# Patient Record
Sex: MALE | Race: WHITE | NOT HISPANIC OR LATINO | Employment: FULL TIME | URBAN - METROPOLITAN AREA
[De-identification: names, ages, dates, MRNs, and addresses within clinical notes are randomized per-mention and may not be internally consistent; named-entity substitution may affect disease eponyms.]

---

## 2020-01-17 ENCOUNTER — OFFICE VISIT (OUTPATIENT)
Dept: ORTHOPEDICS | Facility: CLINIC | Age: 40
End: 2020-01-17
Payer: COMMERCIAL

## 2020-01-17 VITALS — BODY MASS INDEX: 23.8 KG/M2 | WEIGHT: 170 LBS | HEIGHT: 71 IN

## 2020-01-17 VITALS — WEIGHT: 170 LBS | HEIGHT: 71 IN | BODY MASS INDEX: 23.8 KG/M2

## 2020-01-17 DIAGNOSIS — M25.511 CHRONIC RIGHT SHOULDER PAIN: Primary | ICD-10-CM

## 2020-01-17 DIAGNOSIS — G89.29 CHRONIC RIGHT SHOULDER PAIN: Primary | ICD-10-CM

## 2020-01-17 DIAGNOSIS — G25.89 SCAPULAR DYSKINESIS: ICD-10-CM

## 2020-01-17 DIAGNOSIS — Z47.89 ORTHOPEDIC AFTERCARE: Primary | ICD-10-CM

## 2020-01-17 ASSESSMENT — MIFFLIN-ST. JEOR
SCORE: 1708.24
SCORE: 1708.24

## 2020-01-17 NOTE — PROGRESS NOTES
SPORTS & ORTHOPEDIC WALK-IN VISIT 1/17/2020    Primary Care Physician:      About a year ago he was playing dodgeball and he noticed a soreness after. In the Spring he played softball and had shoulder soreness the whole season. He noticed changing his throw which put more stress on his shoulder. He hasn't played softball in a while and the shoulder soreness/pain is still bothering him. He has pain while sleeping on it as well. He doesn't have pain with weight lifting. He did notice that had pain with jumping jacks in a workout class. The majority of his pain is on the top of his shoulder.     Reason for visit:     What part of your body is injured / painful?  right shoulder    What caused the injury /pain? No inciting event     How long ago did your injury occur or pain begin? several months ago    What are your most bothersome symptoms? Pain    How would you characterize your symptom?  Sharp, shooting and soreness    What makes your symptoms better? Nothing    What makes your symptoms worse? Overhead motion    Have you been previously seen for this problem? No    Medical History:    Any recent changes to your medical history? No    Any new medication prescribed since last visit? No    Have you had surgery on this body part before? No    Social History:    Occupation: Teacher    Handedness: Right    Exercise: Softball, lifting    Review of Systems:    Do you have fever, chills, weight loss? No    Do you have any vision problems? No    Do you have any chest pain or edema? No    Do you have any shortness of breath or wheezing?  No    Do you have stomach problems? No    Do you have any numbness or focal weakness? No    Do you have diabetes? No    Do you have problems with bleeding or clotting? No    Do you have an rashes or other skin lesions? No

## 2020-01-17 NOTE — LETTER
"  2020      RE: Nate Rubio  [address] [address]       [facility]  Orthopedics  Emmanuel Coley MD  2020     Name: Nate Rubio  MRN: [id number]  Age: 39 year old  : 1980  Referring provider: Referred Self     Chief Complaint: Surgical Followup (DOS 14 Right Knee Medial PatellaFemoral Ligament Reconstruction With Allograft, Patellar Chondrocyte Implantation, patellar realignment)     Date of Surgery: 2014    Procedure:   1.  Right knee medial patellofemoral ligament reconstruction with allograft.   2.  Right knee patellar autologous chondrocyte implantation.      History of Present Illness:   Nate Rubio is a 39 year old male 6 years status-post the above procedure who presents for postoperative evaluation. He reports no episodes of patellar instability. The patient skates once a week during the winter and plays softball in the summers. He runs and bikes occassionally.     Scores knee as 84. No episodes of knees slipping out. No swelling.       Physical Examination:  Ht 1.803 m (5' 11\")   Wt 77.1 kg (170 lb)   BMI 23.71 kg/m       General: Alert, oriented, no distress.  Skin: Cool to touch without erythema, ecchymosis, or lesions. No dystrophic changes.  Neuro: Neurovascularly intact distally. Sensation intact to light touch.  Cardiovascular: Capillary refill brisk.    Left Knee: No effusion. Range of motion from 4-5 degrees of hyperextension to 140 degrees of flexion on the right, and 4 to 140 on the left. No apprehension or crepetation.      Assessment:   39 year old male  6 years s/p MPFL recontruction and ACI doing very well. Dscussed appropriate activity level and strength training.      Plan:     No extreme running     The patient should begin a program for maintaining strength     Follow up at 10 years. Return ASAP if knee swells or any abnormalities appear.     I spent 15 minutes with the patient, 10 minutes dedicated to counseling, education, " and development of a treatment plan.    Scribe Disclosure:  I, Christine Garvin, am serving as a scribe to document services personally performed by Emmanuel Coley MD at this visit, based upon the provider's statements to me. All documentation has been reviewed by the aforementioned provider prior to being entered into the official medical record.          Emmanuel Coley MD

## 2020-01-17 NOTE — LETTER
RE: Nate Rubio  [address] [address]     Dear Colleague,    Thank you for referring your patient, Nate Rubio, to the [facility] SPORTS AND ORTHOPAEDIC WALK IN CLINIC at [facility]. Please see a copy of my visit note below.          SPORTS & ORTHOPEDIC WALK-IN VISIT 1/17/2020    Primary Care Physician:      About a year ago he was playing dodgeball and he noticed a soreness after. In the Spring he played softball and had shoulder soreness the whole season. He noticed changing his throw which put more stress on his shoulder. He hasn't played softball in a while and the shoulder soreness/pain is still bothering him. He has pain while sleeping on it as well. He doesn't have pain with weight lifting. He did notice that had pain with jumping jacks in a workout class. The majority of his pain is on the top of his shoulder.     Reason for visit:     What part of your body is injured / painful?  right shoulder    What caused the injury /pain? No inciting event     How long ago did your injury occur or pain begin? several months ago    What are your most bothersome symptoms? Pain    How would you characterize your symptom?  Sharp, shooting and soreness    What makes your symptoms better? Nothing    What makes your symptoms worse? Overhead motion    Have you been previously seen for this problem? No    Medical History:    Any recent changes to your medical history? No    Any new medication prescribed since last visit? No    Have you had surgery on this body part before? No    Social History:    Occupation: Teacher    Handedness: Right    Exercise: Softball, lifting    Review of Systems:    Do you have fever, chills, weight loss? No    Do you have any vision problems? No    Do you have any chest pain or edema? No    Do you have any shortness of breath or wheezing?  No    Do you have stomach problems? No    Do you have any numbness or focal weakness?  No    Do you have diabetes? No    Do you have problems with bleeding or clotting? No    Do you have an rashes or other skin lesions? No           SUBJECTIVE: Nate Rubio is a RHD 39 year old male who reports right shoulder pain. 1 year ago playing dodge ball at work, soreness.  Softball in spring and summer.  Hurt when throwing.  Continues to throw and has pain.  Jumping jacks, at yoga with wife.  Very painfl.  Waking up and sore at night, notice more at night.  Certain movements and activities like throwing.  Most when throwing and second at night.  No inbuprofen, no ice.  Not feeling sore on ongoing way.    Never had a shoulder surgery.  As a kid, elbow pain with throwing, maybe has changed his form to accommodate.  Also plays hockey and no issues, swinging doesn't cause issues.  Jolting pain with throwing.  Painful when cocking back, releasing object.  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  Utility umer for softball, switches positions.  Core stuff, yoga- some full body, back exercises.  More leg stuff, 5 year f/u with Dr. Coley    PAST MEDICAL, SOCIAL, SURGICAL AND FAMILY HISTORY: He  has a past medical history of Right knee pain. He also has no past medical history of Asthma, Bleeding disorder (H), Cancer (H), Chronic kidney disease, Degenerative joint disease, Diabetes mellitus (H), Heart disease, Hypertension, or Surgical complications.  He  has a past surgical history that includes wisdom teeth[ (2009); Arthroscopy knee (12/27/2013); Arthrotomy knee autogenous cartilage implant ([employer]) (2/14/2014); and Realign patella (2/14/2014).  His family history is not on file.  He reports that he has never smoked. He has never used smokeless tobacco. He reports current alcohol use. He reports that he does not use drugs.      ALLERGIES: He is allergic to seasonal allergies.    CURRENT MEDICATIONS: He has a current medication list which includes the  "following prescription(s): ibuprofen.     REVIEW OF SYSTEMS: 10 point review of systems is negative except as noted above.    EXAM:  Ht 1.803 m (5' 11\")   Wt 77.1 kg (170 lb)   BMI 23.71 kg/m     CONSTITUTIONIAL: healthy, alert, no distress and cooperative  HEAD: Normocephalic. No masses, lesions, tenderness or abnormalities  ENT: ENT exam normal, no neck nodes or sinus tenderness  SKIN: no suspicious lesions or rashes  GAIT: normal  Stance: normal  NEUROLOGIC: Normal muscle tone and strength, reflexes normal, sensation grossly normal.  PSYCHIATRIC: affect normal/bright and mentation appears normal.    MUSCULOSKELETAL: right posterior shoulder pain    Palpation:  Non-tender SC joint, clavicle, AC joint, acromion, subacromial space, proximal bicep tendon and upper trapezius muscle   Tender: posterior shoulder pain, infraspinatus  More scapular winging with arm rotation  Range of Motion        Active:all normal, painful with arm at 180 degrees, up over head        Passive: all normal, pain at end of arc  Strength: rotator cuff strength full, possible infraspinatus  Special tests: positive Neer's test, Negative King, Negative Cross-Arm adduction, Negative Padilla's    ASSESSMENT/PLAN:  Pt is a 38 yo white male with PMhx of no active medical issues presenting with right shoulder pain when throwing    1. Right posterior shoulder pain, scapular dyskinesis-  MICHELLE PT with Jeremy  Check throwing mechanics, posterior strengthening needed- discussed lat pull downs, rows  Pt usually concentrating on anterior exercises or CORE with yoga    RTC 2 months if not improving  X-RAY INTERPRETATION:   none    Again, thank you for allowing me to participate in the care of your patient.      Sincerely,    Rose Grimes MD      "

## 2020-01-17 NOTE — PROGRESS NOTES
"[facility]  Orthopedics  Emmanuel Coley MD  2020     Name: Nate Rubio  MRN: [id number]  Age: 39 year old  : 1980  Referring provider: Referred Self     Chief Complaint: Surgical Followup (DOS 14 Right Knee Medial PatellaFemoral Ligament Reconstruction With Allograft, Patellar Chondrocyte Implantation, patellar realignment)     Date of Surgery: 2014    Procedure:   1.  Right knee medial patellofemoral ligament reconstruction with allograft.   2.  Right knee patellar autologous chondrocyte implantation.      History of Present Illness:   Nate Rubio is a 39 year old male 6 years status-post the above procedure who presents for postoperative evaluation. He reports no episodes of patellar instability. The patient skates once a week during the winter and plays softball in the summers. He runs and bikes occassionally.     Scores knee as 84. No episodes of knees slipping out. No swelling.       Physical Examination:  Ht 1.803 m (5' 11\")   Wt 77.1 kg (170 lb)   BMI 23.71 kg/m      General: Alert, oriented, no distress.  Skin: Cool to touch without erythema, ecchymosis, or lesions. No dystrophic changes.  Neuro: Neurovascularly intact distally. Sensation intact to light touch.  Cardiovascular: Capillary refill brisk.    Left Knee: No effusion. Range of motion from 4-5 degrees of hyperextension to 140 degrees of flexion on the right, and 4 to 140 on the left. No apprehension or crepetation.      Assessment:   39 year old male  6 years s/p MPFL recontruction and ACI doing very well. Dscussed appropriate activity level and strength training.      Plan:     No extreme running     The patient should begin a program for maintaining strength     Follow up at 10 years. Return ASAP if knee swells or any abnormalities appear.     I spent 15 minutes with the patient, 10 minutes dedicated to counseling, education, and development of a treatment plan.    Scribe Disclosure:  I, Christine Garvin, am " serving as a scribe to document services personally performed by Emmanuel Coley MD at this visit, based upon the provider's statements to me. All documentation has been reviewed by the aforementioned provider prior to being entered into the official medical record.

## 2020-01-17 NOTE — PROGRESS NOTES
SUBJECTIVE: Nate Rubio is a RHD 39 year old male who reports right shoulder pain. 1 year ago playing dodge ball at work, soreness.  Softball in spring and summer.  Hurt when throwing.  Continues to throw and has pain.  Jumping jacks, at yoga with wife.  Very painfl.  Waking up and sore at night, notice more at night.  Certain movements and activities like throwing.  Most when throwing and second at night.  No inbuprofen, no ice.  Not feeling sore on ongoing way.    Never had a shoulder surgery.  As a kid, elbow pain with throwing, maybe has changed his form to accommodate.  Also plays hockey and no issues, swinging doesn't cause issues.  Jolting pain with throwing.  Painful when cocking back, releasing object.  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  Utility umer for softball, switches positions.  Core stuff, yoga- some full body, back exercises.  More leg stuff, 5 year f/u with Dr. Coley    PAST MEDICAL, SOCIAL, SURGICAL AND FAMILY HISTORY: He  has a past medical history of Right knee pain. He also has no past medical history of Asthma, Bleeding disorder (H), Cancer (H), Chronic kidney disease, Degenerative joint disease, Diabetes mellitus (H), Heart disease, Hypertension, or Surgical complications.  He  has a past surgical history that includes wisdom teeth[ (2009); Arthroscopy knee (12/27/2013); Arthrotomy knee autogenous cartilage implant ([employer]) (2/14/2014); and Realign patella (2/14/2014).  His family history is not on file.  He reports that he has never smoked. He has never used smokeless tobacco. He reports current alcohol use. He reports that he does not use drugs.      ALLERGIES: He is allergic to seasonal allergies.    CURRENT MEDICATIONS: He has a current medication list which includes the following prescription(s): ibuprofen.     REVIEW OF SYSTEMS: 10 point review of systems is negative except as noted above.    EXAM:   1.803 m (5'  "11\")   Wt 77.1 kg (170 lb)   BMI 23.71 kg/m    CONSTITUTIONIAL: healthy, alert, no distress and cooperative  HEAD: Normocephalic. No masses, lesions, tenderness or abnormalities  ENT: ENT exam normal, no neck nodes or sinus tenderness  SKIN: no suspicious lesions or rashes  GAIT: normal  Stance: normal  NEUROLOGIC: Normal muscle tone and strength, reflexes normal, sensation grossly normal.  PSYCHIATRIC: affect normal/bright and mentation appears normal.    MUSCULOSKELETAL: right posterior shoulder pain    Palpation:  Non-tender SC joint, clavicle, AC joint, acromion, subacromial space, proximal bicep tendon and upper trapezius muscle   Tender: posterior shoulder pain, infraspinatus  More scapular winging with arm rotation  Range of Motion        Active:all normal, painful with arm at 180 degrees, up over head        Passive: all normal, pain at end of arc  Strength: rotator cuff strength full, possible infraspinatus  Special tests: positive Neer's test, Negative King, Negative Cross-Arm adduction, Negative Padilla's        ASSESSMENT/PLAN:  Pt is a 38 yo white male with PMhx of no active medical issues presenting with right shoulder pain when throwing    1. Right posterior shoulder pain, scapular dyskinesis-  MICHELLE PT with Jeremy  Check throwing mechanics, posterior strengthening needed- discussed lat pull downs, rows  Pt usually concentrating on anterior exercises or CORE with yoga    RTC 2 months if not improving  X-RAY INTERPRETATION:   none  "

## 2020-02-07 ENCOUNTER — THERAPY VISIT (OUTPATIENT)
Dept: PHYSICAL THERAPY | Facility: CLINIC | Age: 40
End: 2020-02-07
Attending: FAMILY MEDICINE
Payer: COMMERCIAL

## 2020-02-07 DIAGNOSIS — G89.29 CHRONIC RIGHT SHOULDER PAIN: ICD-10-CM

## 2020-02-07 DIAGNOSIS — M25.511 RIGHT SHOULDER PAIN: ICD-10-CM

## 2020-02-07 DIAGNOSIS — M25.511 CHRONIC RIGHT SHOULDER PAIN: ICD-10-CM

## 2020-02-07 DIAGNOSIS — G25.89 SCAPULAR DYSKINESIS: ICD-10-CM

## 2020-02-07 PROCEDURE — 97110 THERAPEUTIC EXERCISES: CPT | Mod: GP | Performed by: PHYSICAL THERAPIST

## 2020-02-07 PROCEDURE — 97161 PT EVAL LOW COMPLEX 20 MIN: CPT | Mod: GP | Performed by: PHYSICAL THERAPIST

## 2020-02-07 NOTE — PROGRESS NOTES
"Physical Therapy Initial Evaluation  February 7, 2020     MD Instructions/Precautions/Restrictions: PT eval and treat.     Therapist Impression:   Nate Rubio presents with findings consistent with possible SLAP tear vs posterior impingement, with related impairments limiting his ability to lie on shoulder, throw objects. Skilled PT services are necessary in order to reduce impairments and improve independent function.     Subjective:   C/C: R superoposterior shoulder pain, deep and aching. Occasionally sharp and jolting if he does certain movements of his shoulder. Worse with throwing (follow through worse than late cocking), jumping jacks, lying on R shoulder. Hitting and hockey (R-handed) are OK. Plays softball (utility player), baseball as a kid and played sliding scale. He felt like he \"threw out\" his arm a lot as a kid and had chronic medial elbow pain, and feels like as he aged he changed how he throws to throw from a more overhead position.     General health as reported by patient: good  Pertinent medical/surgical history: Refer to health history in EMR.   Imaging: none recently.   Prior treatment? 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   Typical Physical Activities: yoga, softball, gym workouts.   Patient's goals are: decrease pain.   Return to MD:  PRN.       Objective:  SHOULDER EXAMINATION    STATIC POSTURE  Rounded shoulders (mild)    Scapular Kinematic Evaluation:   Position/Test Findings   Hands resting at sides Downward rotation B and Inferior boarder prominence B   Hands on hips Downward rotation B and Inferior boarder prominence B   90deg scaption Inferior boarder prominence B   Repeated elevation       Plane of ABD       Plane of Flexion   Eccentric loss of control L>R, more prominent in the plane of flexion     SHOULDER RANGE OF MOTION & STRENGTH  (*Indicates patient s pain)   PROM L PROM R AROM L AROM R MMT L MMT R   Flex   180 180*     ABD   180 180 5 5 "   ER 90 90 75 80 (+/-)  Improved with GHJ AP manual stab 5- 5-   IR 80 80   5- 5-   Ext/IR   T5 T5*       SPECIAL TESTS   (+) L: NA   (-) L: NA  (+) R: Neer, Dynamic labral shear and Cross-body adduction, equivocal apprehension-relocation   (-) R: Painful arc, Full can, Sulcus sign and Ghent's (active compression)      Assessment/Plan:    The patient is a 39 year old male with chief complaint of R shoulder pain.    The patient has the following significant findings with corresponding treatment plan.  Diagnosis 1:  R shoulder pain, possible SLAP tear vs posterior impingement    Pain -  hot/cold therapy, manual therapy, splint/taping/bracing/orthotics and self management  Decreased ROM/flexibility - manual therapy, therapeutic exercise and home program  Decreased joint mobility - manual therapy, therapeutic exercise and home program  Decreased strength - therapeutic exercise, therapeutic activities and home program  Impaired muscle performance - neuro re-education and home program  Decreased function - therapeutic activities and home program  Impaired posture - neuro re-education, therapeutic activities and home program  Instability -  Therapeutic Activity, Therapeutic Exercise, Neuromuscular Re-education, Splinting/Taping/Bracing/Orthotic, home program      Therapy Evaluation Codes:   1) History comprised of:   Personal factors that impact the plan of care:      Please refer to health history in EMR.    Comorbidity factors that impact the plan of care are:      Please refer to health history in EMR.     Medications impacting care: None.  2) Examination of Body Systems comprised of:   Body structures and functions that impact the plan of care:      Shoulder.   Activity limitations that impact the plan of care are:      Throwing and Laying down.   Clinical presentation characteristics are:    Stable/Uncomplicated.  3) Presentation comprised of:   Presentation scored as Low complexity with uncomplicated  characteristics..  4) Decision-Making    Low complexity using standardized patient assessment instrument and/or measureable assessment of functional outcome.  Cumulative Therapy Evaluation is: Low complexity.    Previous and current functional limitations:  (See Goal Flow Sheet for this information)    Short term and Long term goals: (See Goal Flow Sheet for this information)     Communication ability:  Patient appears to be able to clearly communicate and understand verbal and written communication and follow directions correctly.  Treatment Explanation - The following has been discussed with the patient: RX ordered/plan of care, anticipated outcomes, and possible risks and side effects.  This patient would benefit from PT intervention to resume normal activities.   Rehab potential is good.    Frequency:  2 X a month, once daily  Duration:  for 3 months  Discharge Plan: Achieve all LTGs, be independent in home treatment program, and reach maximal therapeutic benefit.    Please refer to the daily flowsheet for treatment today, total treatment time and time spent performing 1:1 timed codes.

## 2020-02-21 ENCOUNTER — THERAPY VISIT (OUTPATIENT)
Dept: PHYSICAL THERAPY | Facility: CLINIC | Age: 40
End: 2020-02-21
Payer: COMMERCIAL

## 2020-02-21 DIAGNOSIS — M25.511 RIGHT SHOULDER PAIN: ICD-10-CM

## 2020-02-21 PROCEDURE — 97110 THERAPEUTIC EXERCISES: CPT | Mod: GP | Performed by: PHYSICAL THERAPIST

## 2020-02-21 PROCEDURE — 97112 NEUROMUSCULAR REEDUCATION: CPT | Mod: GP | Performed by: PHYSICAL THERAPIST

## 2020-03-06 ENCOUNTER — THERAPY VISIT (OUTPATIENT)
Dept: PHYSICAL THERAPY | Facility: CLINIC | Age: 40
End: 2020-03-06
Payer: COMMERCIAL

## 2020-03-06 DIAGNOSIS — M25.511 RIGHT SHOULDER PAIN: ICD-10-CM

## 2020-03-06 PROCEDURE — 97112 NEUROMUSCULAR REEDUCATION: CPT | Mod: GP | Performed by: PHYSICAL THERAPIST

## 2020-03-06 PROCEDURE — 97110 THERAPEUTIC EXERCISES: CPT | Mod: GP | Performed by: PHYSICAL THERAPIST

## 2020-03-06 PROCEDURE — 97530 THERAPEUTIC ACTIVITIES: CPT | Mod: GP | Performed by: PHYSICAL THERAPIST

## 2020-03-06 NOTE — PROGRESS NOTES
Strength Testing: Handheld Dynamometry     Left Right Difference   ABD 25 20 -5   ER 32 32 0   IR 40 35 -5   90-90 ER 25 27 +2   90-90 IR 33 25 -8     ER/IR Ratio:   Right: 91%            90-90: 108%  Left: 80%            90-90: 76%    CKCUEST: 20 touches    UE-YBT   Left Right LSI%   Sup-Lat 23 in 19.5 in 85%   Inf-Lat 23.5 in 24 in 102%   Medial 38.5 in 36 in 94%

## 2020-03-20 ENCOUNTER — THERAPY VISIT (OUTPATIENT)
Dept: PHYSICAL THERAPY | Facility: CLINIC | Age: 40
End: 2020-03-20
Payer: COMMERCIAL

## 2020-03-20 DIAGNOSIS — M25.511 RIGHT SHOULDER PAIN: ICD-10-CM

## 2020-03-20 PROCEDURE — 99207 C NONPHYSICIAN TELEPHONE ASSESSMENT 21-30 MIN: CPT | Mod: GP | Performed by: PHYSICAL THERAPIST

## 2020-03-20 NOTE — PROGRESS NOTES
"The patient has been notified of following:     \"This virtual visit will be conducted between you and your provider. We have found that certain health care needs can be provided without the need for physical presence.  This service lets us provide the care you need with a short virtual visit.  At this time, you will not be charged for this visit as we do not have the processes in place to bill for virtual encounters.\"    Due to external, as well as internal [facility] management of COVID-19 Virus, Nate Rubio was not seen in our clinic.  As a substitution, we implemented a virtual visit to manage this patient's condition utilizing the [employer] virtual visit platform via the patient s existing code.  The [employer] platform uses a synchronous HIPAA compliant video stream for this patient encounter.            S: Patient connected virtually on the gauzzx platform to discuss their condition/progress. They noted improvements in overall pain/discomfort in the 90-90 ABD/ER position.  They noted ongoing pain or limitations with quick/unexpected movements of arm he'll get a twinge, as well as occasionally discomfort when lying on the involved shoulder.  Has been compliant with home exercise program as prescribed. Feels like he has capacity for more exercises than are on his website.     O: Patient demonstrated good understanding of new stability ball shoulder strengthening exercises, including ball walkouts, ball knee tucks, pec flies on stability ball.      A:   Progressing as expected. Endurance for closed-chain exercises improving, will advance exercises to this end.    P: Patient will continue with the exercise program assigned on their PTRx code with today's updates. When appropriate for next in-person clinic visit and in conjunction with timing the softball season, we will examine his throwing mechanics.         Virtual visit contact time  Visit Started at 12:15pm  Visit Ended at 12:39pm  Total Time for documentation: 5 " minutes    Procedure Code (For internal tracking only, please document any charges you would apply)  Therapeutic Exercise: 10 minutes  Therapeutic Activities: 14 minutes         I have reviewed the note as documented above.  This accurately captures the substance of my conversation with the patient.  Jeremy Kraft, PT    Any subjective info about the quality of the visit, ease of use: Very easy. 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

## 2020-04-03 ENCOUNTER — VIRTUAL VISIT (OUTPATIENT)
Dept: PHYSICAL THERAPY | Facility: CLINIC | Age: 40
End: 2020-04-03
Payer: COMMERCIAL

## 2020-04-03 DIAGNOSIS — M25.511 RIGHT SHOULDER PAIN: ICD-10-CM

## 2020-04-03 PROCEDURE — 97112 NEUROMUSCULAR REEDUCATION: CPT | Mod: GP | Performed by: PHYSICAL THERAPIST

## 2020-04-03 PROCEDURE — 97110 THERAPEUTIC EXERCISES: CPT | Mod: GP | Performed by: PHYSICAL THERAPIST

## 2020-04-03 NOTE — PROGRESS NOTES
"Physical Therapy Virtual Follow Up Visit      The patient has been notified of following:     \"This virtual visit will be conducted between you and your provider. We have found that certain health care needs can be provided without the need for physical presence.  This service lets us provide the care you need with a virtual visit.\"    Due to external, as well as internal [facility] management of the COVID-19 Virus, Nate Rubio was not seen in our clinic.  As a substitution, we implemented a virtual visit to manage this patient's condition utilizing the PTRx virtual visit platform via the patient s existing code.  The provider, Jeremy Kraft, reviewed the patient's chart, PTRx prescription, and spoke with the patient to determine the following telemedicine visit is appropriate and effective for the patient's care.    The following type of visit was completed:   Video Visit:  The PTRx platform uses a synchronous HIPAA compliant video stream for this patient encounter.        S: Nate Rubio is a 39 year old male. Connected virtually on the PTRx platform to discuss their condition/progress. They noted improvements in muscle endurance with HEP, feels that strength and fatigability have improved. Was able to play very gentle catch with his wife the other day - no effort to throw far/hard and his shoulder felt fine. Every now and then, he'll notice a twinge of pain with sudden movements of his arm. The specific movement itself can vary, but seems most consistent with extremes of ER ROM.   Current pain level: 0/10    O: Patient demonstrated ER ROM at base to 90deg with onset of discomfort, at 90-90 position to 75-80deg with onset of discomfort.      PTRx Content from today's visit:  Exercise Name: Shoulder Theraband Internal Rotation, Sets: 3 - Reps: 10 - Sessions: 4x/week, Notes: black band  Exercise Name: Theraband Shoulder External Rotation at 90/90, Sets: 1 - Reps: 30 - Sessions: 4x/week, Notes: blue " band  Exercise Name: Theraband Shoulder IR at 90/90, Sets: 3 - Reps: 10 - Sessions: 4x/week, Notes: black band  Exercise Name: All 4s Serratus Press - Reps: 30 - Sessions: 4x/week  Exercise Name: Proprioception At Counter Weight Shift, Notes: Pushup position, alternate hand or elbow taps    A:   Patient is progressing as expected.    P: Patient will continue with the exercise program assigned on their PTRx code.     Continue current treatment plan until patient demonstrates readiness to progress to higher level exercises.      Virtual visit contact time    Time of service began: 12:10 PM  Time of service ended: 12:38 PM  Total Time for set up, visit, and documentation: 32 minutes    Payor: [employer] / Plan: [employer] OPEN ACCESS / Product Type: HMO /   .  Procedure Code/s   Therapeutic Exercise (39698): 10 minutes  Neuromuscular Re-education (84994): 15 minutes  Therapeutic Activities (53251): 3 minutes    I have reviewed the note as documented above.  This accurately captures the substance of my conversation with the patient.  Provider location: [place], [place] (City/State)  Patient location: [place], [place]

## 2020-05-01 ENCOUNTER — VIRTUAL VISIT (OUTPATIENT)
Dept: PHYSICAL THERAPY | Facility: CLINIC | Age: 40
End: 2020-05-01
Payer: COMMERCIAL

## 2020-05-01 DIAGNOSIS — M25.511 RIGHT SHOULDER PAIN: ICD-10-CM

## 2020-05-01 PROCEDURE — 97112 NEUROMUSCULAR REEDUCATION: CPT | Mod: GT | Performed by: PHYSICAL THERAPIST

## 2020-05-01 PROCEDURE — 97530 THERAPEUTIC ACTIVITIES: CPT | Mod: GT | Performed by: PHYSICAL THERAPIST

## 2020-05-01 PROCEDURE — 97110 THERAPEUTIC EXERCISES: CPT | Mod: GT | Performed by: PHYSICAL THERAPIST

## 2020-05-01 NOTE — PROGRESS NOTES
"Physical Therapy Virtual Follow Up Visit      The patient has been notified of following:     \"This virtual visit will be conducted between you and your provider. We have found that certain health care needs can be provided without the need for physical presence.  This service lets us provide the care you need with a virtual visit.\"    Due to external, as well as internal [facility] management of the COVID-19 Virus, Nate Rubio was not seen in our clinic.  As a substitution, we implemented a virtual visit to manage this patient's condition utilizing the [employer] virtual visit platform via the patient s existing code.  The provider, Jeremy Kraft, reviewed the patient's chart, PTRx prescription, and spoke with the patient to determine the following telemedicine visit is appropriate and effective for the patient's care.    The following type of visit was completed:   Video Visit:  The [employer] platform uses a synchronous HIPAA compliant video stream for this patient encounter.        S: Nate Rubio is a 39 year old male. Connected virtually on the [employer] platform to discuss their condition/progress. Feels like his shoulder is doing fine. Not much new to report. Hasn't really had added opportunity to do upper body workouts or do throwing, likely due to restrictions in community participation due to COVID. In the last few weeks, feels like he is having fewer and fewer twinging sensations when arm is in late-cocking position.   Current pain level: 0/10, no c/o instability.       O: Patient demonstrated 90deg ER in 90deg ABD with no pain, apprehension, or sensation of instability. Aside from this, full painfree ROM.     PTRx Content from today's visit:  Exercise Name: Shoulder Theraband Internal Rotation, Sets: 3 - Reps: 10 - Sessions: 4x/week, Notes: black band  Exercise Name: Theraband Shoulder External Rotation at 90/90, Sets: 1 - Reps: 30 - Sessions: 4x/week, Notes: blue band  Exercise Name: Theraband Shoulder IR at " 90/90, Sets: 3 - Reps: 10 - Sessions: 4x/week, Notes: black band  Exercise Name: All 4s Serratus Press - Reps: 30 - Sessions: 4x/week  Exercise Name: Proprioception At Counter Weight Shift, Notes: Pushup position, alternate hand or elbow taps    A:   Patient is progressing as expected.    P: Patient will continue with the exercise program assigned on their PTRx code.    Virtual visit contact time    Time of service began: 1:10 PM  Time of service ended: 38 PM  Total Time for set up, visit, and documentation: 31 minutes    Payor: [employer] / Plan: [employer] OPEN ACCESS / Product Type: HMO /   .  Procedure Code/s   Therapeutic Exercise (65511): 10 minutes  Neuromuscular Re-education (44157): 8 minutes  Therapeutic Activities (16956): 10 minutes    I have reviewed the note as documented above.  This accurately captures the substance of my conversation with the patient.  Provider location: [place], [place] (City/State)  Patient location: Home

## 2020-05-29 ENCOUNTER — VIRTUAL VISIT (OUTPATIENT)
Dept: PHYSICAL THERAPY | Facility: CLINIC | Age: 40
End: 2020-05-29
Payer: COMMERCIAL

## 2020-05-29 DIAGNOSIS — M25.511 RIGHT SHOULDER PAIN: ICD-10-CM

## 2020-05-29 PROCEDURE — 97530 THERAPEUTIC ACTIVITIES: CPT | Mod: GT | Performed by: PHYSICAL THERAPIST

## 2020-05-29 PROCEDURE — 97112 NEUROMUSCULAR REEDUCATION: CPT | Mod: GT | Performed by: PHYSICAL THERAPIST

## 2020-05-29 NOTE — PROGRESS NOTES
"Physical Therapy Virtual Follow Up Visit      The patient has been notified of following:     \"This virtual visit will be conducted between you and your provider. We have found that certain health care needs can be provided without the need for physical presence.  This service lets us provide the care you need with a virtual visit.\"    Due to external, as well as internal [facility] management of the COVID-19 Virus, Nate Rubio was not seen in our clinic.  As a substitution, we implemented a virtual visit to manage this patient's condition utilizing the "[employer]x virtual visit platform via the patient s existing code.  The provider, Jeremy Kraft, reviewed the patient's chart, PTRx prescription, and spoke with the patient to determine the following telemedicine visit is appropriate and effective for the patient's care.    The following type of visit was completed:   Video Visit:  The "[employer]x platform uses a synchronous HIPAA compliant video stream for this patient encounter.        S: Nate Rubio is a 39 year old male. Connected virtually on the "[employer]x platform to discuss their condition/progress.   He notes overall improvement of shoulder, not as uncomfortable to get into position of ABD/ER. Still having some difficulty sleeping. Strengthening seems to help when he can get to it, but is having a hard time with COVID getting into a routine where he does it consistently.   Current pain level: No change    O:   Throwing Analysis    Demonstrates GOOD technique with the following aspects:   Follow through across body, long and low  Appropriate hand break position  Hand break leg drive timing/sequencing    Demonstrates throwing mechanics FAULTS with/during:  Poor timing/sequencing with foot flat and achieving cocking position.  Inverted W positioning  Significant degree of early trunk/hip opening at late cocking    Corrective Exercises given based on findings:  1.) Conductor drill with stride to work on timing (due to " being a side arm thrower, can achieve long-arm position as opposed to 90deg elbow bend for Power T position)  Can also do more of a short arm cocking phase in order to match sequencing of stride.      A:   Patient is progressing as expected.    P: Patient will continue with the exercise program assigned on their PTRx code       Virtual visit contact time    Time of service began: 1:00 PM  Time of service ended: 1:25 PM  Total Time for set up, visit, and documentation: 28 minutes    Payor: [employer] / Plan: [employer] OPEN ACCESS / Product Type: HMO /   .  Procedure Code/s   Neuromuscular Re-education (70503): 13 minutes  Therapeutic Activities (49177): 12 minutes    I have reviewed the note as documented above.  This accurately captures the substance of my conversation with the patient.  Provider location: [place], [place] (City/State)  Patient location: Home

## 2020-12-18 NOTE — PROGRESS NOTES
Discharge Note    Progress reporting period is from initial eval to May 29, 2020.     Nate failed to return for next follow up visit and current status is unknown.  Please see information below for last relevant information on current status.  Patient seen for Rxs Used: 6 visits.    SUBJECTIVE  Subjective changes noted by patient:   .  Current pain level is  .     Previous pain level was  Initial Pain level: 3/10.   Changes in function:  Yes (See Goal flowsheet attached for changes in current functional level)  Adverse reaction to treatment or activity: None    OBJECTIVE  Changes noted in objective findings:  .    ASSESSMENT/PLAN  Diagnosis: R shoulder pain, possible SLAP tear vs posterior impingement   DIAGP:  The encounter diagnosis was Right shoulder pain.  Updated problem list and treatment plan:     Pain - HEP  Decreased function - HEP  Decreased strength - HEP    STG/LTGs have been met or progress has been made towards goals:  Yes, please see goal flowsheet for most current information.    Assessment of Progress: current status is unknown.  Last current status:  .  Self Management Plans:  HEP    I have re-evaluated this patient and find that the nature, scope, duration and intensity of the therapy is appropriate for the medical condition of the patient.  Nate continues to require the following intervention to meet STG and LTG's:  HEP.    Recommendations:  Discharge with current home program.  Patient to follow up with MD as needed. Episode to be closed at this time and patient formally discharged from therapy.    Jeremy Kraft, PT, DPT, OCS      Please refer to the daily flowsheet for treatment today, total treatment time and time spent performing 1:1 timed codes.

## 2021-04-18 ENCOUNTER — HEALTH MAINTENANCE LETTER (OUTPATIENT)
Age: 41
End: 2021-04-18

## 2021-04-18 ENCOUNTER — MYC MEDICAL ADVICE (OUTPATIENT)
Dept: ORTHOPEDICS | Facility: CLINIC | Age: 41
End: 2021-04-18

## 2021-04-18 DIAGNOSIS — G89.29 CHRONIC RIGHT SHOULDER PAIN: Primary | ICD-10-CM

## 2021-04-18 DIAGNOSIS — M25.511 CHRONIC RIGHT SHOULDER PAIN: Primary | ICD-10-CM

## 2021-04-28 ENCOUNTER — THERAPY VISIT (OUTPATIENT)
Dept: PHYSICAL THERAPY | Facility: CLINIC | Age: 41
End: 2021-04-28
Attending: FAMILY MEDICINE
Payer: COMMERCIAL

## 2021-04-28 DIAGNOSIS — M25.511 CHRONIC RIGHT SHOULDER PAIN: ICD-10-CM

## 2021-04-28 DIAGNOSIS — G89.29 CHRONIC RIGHT SHOULDER PAIN: ICD-10-CM

## 2021-04-28 DIAGNOSIS — M25.511 RIGHT SHOULDER PAIN: Primary | ICD-10-CM

## 2021-04-28 PROCEDURE — 97530 THERAPEUTIC ACTIVITIES: CPT | Mod: GP | Performed by: PHYSICAL THERAPIST

## 2021-04-28 PROCEDURE — 97110 THERAPEUTIC EXERCISES: CPT | Mod: GP | Performed by: PHYSICAL THERAPIST

## 2021-04-28 PROCEDURE — 97161 PT EVAL LOW COMPLEX 20 MIN: CPT | Mod: GP | Performed by: PHYSICAL THERAPIST

## 2021-04-28 NOTE — PROGRESS NOTES
Physical Therapy Initial Evaluation  April 28, 2021     MD Instructions/Precautions/Restrictions: PT eval and treat.     Therapist Impression:   Nate Rubio presents with findings consistent with internal impingement, with related impairments limiting his ability to reach in a variety of directions, push objects, sleep, throw a ball. Skilled PT services are necessary in order to reduce impairments and improve independent function.     Subjective:   HPI: R shoulder pain, posterolateral, occurs when throwing/playing softball. Throws R, bats R, no issues with batting. Playing primarily LF, occasionally rotates around field. Season just started last week. Previous patient of mine who worked before on internal impingement/microinstability with good success.     Answers for HPI/ROS submitted by the patient on 4/25/2021   History Reported by Patient  Reason for Visit:: Chronic Shoulder Issue  When problem began:: 6/1/2018  How problem occurred:: Throwing (softball, etc.)  Number scale: 1/10  General health as reported by patient: good  Please check all that apply to your current or past medical history: thyroid problems  Medical allergies: none  Surgeries: orthopedic surgery  Medications you are currently taking: pain medication  Occupation:: Teacher  What are your primary job tasks: computer work, prolonged standing        Objective:  SHOULDER EXAMINATION      Scapular Kinematic Evaluation:   Position/Test Findings   Hands resting at sides Downward rotation bilaterally   Hands on hips Inferior boarder prominence B   90deg scaption Inferior boarder prominence B and Medial boarder prominence B   Repeated elevation       Plane of ABD       Plane of Flexion   Inferior boarder prominence B  Inferior boarder prominence B and Medial boarder prominence B (worse than in ABD)     SHOULDER RANGE OF MOTION & STRENGTH  (*Indicates patient s pain)   AROM L AROM R   Flex 180 180*    180*   ER 90 95* (pain relieved with AP  relocation maneuver)   IR     Ext/IR T6 T6     Strength Testing: Handheld Dynamometry     (lbs force)   Left Right % contralateral   ER 33 38 115%   IR 40 47 118%   ABD 31 25 81%     IR/ER Strength Ratio:        (Goal 4:3 or 1.33-1.5)  Right: 1.24  Left: 1.21      Assessment/Plan:    The patient is a 40 year old male with chief complaint of R shoulder pain.    The patient has the following significant findings with corresponding treatment plan.  Diagnosis 1:  R shoulder pain, internal impingement    Pain -  hot/cold therapy, manual therapy, splint/taping/bracing/orthotics and self management  Decreased ROM/flexibility - manual therapy, therapeutic exercise and home program  Decreased joint mobility - manual therapy, therapeutic exercise and home program  Decreased strength - therapeutic exercise, therapeutic activities and home program  Impaired balance - neuro re-education, therapeutic activities and home program  Decreased proprioception - neuro re-education and therapeutic activities  Impaired gait - gait training and assistive devices  Impaired muscle performance - neuro re-education and home program  Decreased function - therapeutic activities and home program  Impaired posture - neuro re-education, therapeutic activities and home program  Instability -  Therapeutic Activity, Therapeutic Exercise, Neuromuscular Re-education, Splinting/Taping/Bracing/Orthotic, home program      Therapy Evaluation Codes:   1) History comprised of:   Personal factors that impact the plan of care:      Please refer to health history in EMR.    Comorbidity factors that impact the plan of care are:      Please refer to health history in EMR.     Medications impacting care: None.  2) Examination of Body Systems comprised of:   Body structures and functions that impact the plan of care:      Shoulder.   Activity limitations that impact the plan of care are:      Reaching, sleeping, pushing, throwing.   Clinical presentation  characteristics are:    Stable/Uncomplicated.  3) Presentation comprised of:   Presentation scored as Low complexity with uncomplicated characteristics..  4) Decision-Making    Low complexity using standardized patient assessment instrument and/or measureable assessment of functional outcome.  Cumulative Therapy Evaluation is: Low complexity.    Previous and current functional limitations:  (See Goal Flow Sheet for this information)    Short term and Long term goals: (See Goal Flow Sheet for this information)     Communication ability:  Patient appears to be able to clearly communicate and understand verbal and written communication and follow directions correctly.  Treatment Explanation - The following has been discussed with the patient: RX ordered/plan of care, anticipated outcomes, and possible risks and side effects.  This patient would benefit from PT intervention to resume normal activities.   Rehab potential is excellent.    Frequency:  2 X a month, once daily  Duration:  for 2 months  Discharge Plan: Achieve all LTGs, be independent in home treatment program, and reach maximal therapeutic benefit.    Please refer to the daily flowsheet for treatment today, total treatment time and time spent performing 1:1 timed codes.

## 2021-05-26 ENCOUNTER — THERAPY VISIT (OUTPATIENT)
Dept: PHYSICAL THERAPY | Facility: CLINIC | Age: 41
End: 2021-05-26
Payer: COMMERCIAL

## 2021-05-26 DIAGNOSIS — M25.511 RIGHT SHOULDER PAIN: ICD-10-CM

## 2021-05-26 PROCEDURE — 97110 THERAPEUTIC EXERCISES: CPT | Mod: GP | Performed by: PHYSICAL THERAPIST

## 2021-05-26 PROCEDURE — 97112 NEUROMUSCULAR REEDUCATION: CPT | Mod: GP | Performed by: PHYSICAL THERAPIST

## 2021-05-26 PROCEDURE — 97530 THERAPEUTIC ACTIVITIES: CPT | Mod: GP | Performed by: PHYSICAL THERAPIST

## 2021-05-26 NOTE — PROGRESS NOTES
Throwing Analysis    Demonstrates GOOD technique with the following aspects:   Stride length and foot position  Arm slot  Follow through  Ball release point    Demonstrates throwing mechanics FAULTS with/during:  Early front hip opening  Poor sequencing of arm path and stride    Corrective Exercises given based on findings:  1.) Body turns: field imaginary ground ball, perform body turn until feet are aligned with target line - make sure forward hip is not open at foot contact  2.) Stride drill with arm path - emphasize down-back-up path timed properly with front foot landing

## 2021-06-23 DIAGNOSIS — M25.561 ACUTE PAIN OF RIGHT KNEE: Primary | ICD-10-CM

## 2021-07-14 ENCOUNTER — THERAPY VISIT (OUTPATIENT)
Dept: PHYSICAL THERAPY | Facility: CLINIC | Age: 41
End: 2021-07-14
Attending: ORTHOPAEDIC SURGERY
Payer: COMMERCIAL

## 2021-07-14 DIAGNOSIS — M25.561 ACUTE PAIN OF RIGHT KNEE: ICD-10-CM

## 2021-07-14 PROCEDURE — 97530 THERAPEUTIC ACTIVITIES: CPT | Mod: GP | Performed by: PHYSICAL THERAPIST

## 2021-07-14 PROCEDURE — 97161 PT EVAL LOW COMPLEX 20 MIN: CPT | Mod: GP | Performed by: PHYSICAL THERAPIST

## 2021-07-14 PROCEDURE — 97110 THERAPEUTIC EXERCISES: CPT | Mod: GP | Performed by: PHYSICAL THERAPIST

## 2021-07-14 ASSESSMENT — ACTIVITIES OF DAILY LIVING (ADL)
LIMPING: I HAVE THE SYMPTOM BUT IT DOES NOT AFFECT MY ACTIVITY
WEAKNESS: THE SYMPTOM AFFECTS MY ACTIVITY SLIGHTLY
KNEE_ACTIVITY_OF_DAILY_LIVING_SCORE: 78.57
RISE FROM A CHAIR: ACTIVITY IS NOT DIFFICULT
SQUAT: ACTIVITY IS SOMEWHAT DIFFICULT
GO DOWN STAIRS: ACTIVITY IS NOT DIFFICULT
GIVING WAY, BUCKLING OR SHIFTING OF KNEE: THE SYMPTOM AFFECTS MY ACTIVITY SLIGHTLY
AS_A_RESULT_OF_YOUR_KNEE_INJURY,_HOW_WOULD_YOU_RATE_YOUR_CURRENT_LEVEL_OF_DAILY_ACTIVITY?: NEARLY NORMAL
HOW_WOULD_YOU_RATE_THE_OVERALL_FUNCTION_OF_YOUR_KNEE_DURING_YOUR_USUAL_DAILY_ACTIVITIES?: ABNORMAL
GO UP STAIRS: ACTIVITY IS SOMEWHAT DIFFICULT
STAND: ACTIVITY IS NOT DIFFICULT
HOW_WOULD_YOU_RATE_THE_CURRENT_FUNCTION_OF_YOUR_KNEE_DURING_YOUR_USUAL_DAILY_ACTIVITIES_ON_A_SCALE_FROM_0_TO_100_WITH_100_BEING_YOUR_LEVEL_OF_KNEE_FUNCTION_PRIOR_TO_YOUR_INJURY_AND_0_BEING_THE_INABILITY_TO_PERFORM_ANY_OF_YOUR_USUAL_DAILY_ACTIVITIES?: 65
WALK: ACTIVITY IS NOT DIFFICULT
STIFFNESS: I DO NOT HAVE THE SYMPTOM
KNEEL ON THE FRONT OF YOUR KNEE: ACTIVITY IS SOMEWHAT DIFFICULT
RAW_SCORE: 55
KNEE_ACTIVITY_OF_DAILY_LIVING_SUM: 55
SWELLING: I HAVE THE SYMPTOM BUT IT DOES NOT AFFECT MY ACTIVITY
SIT WITH YOUR KNEE BENT: ACTIVITY IS NOT DIFFICULT
PAIN: THE SYMPTOM AFFECTS MY ACTIVITY MODERATELY

## 2021-07-14 NOTE — PROGRESS NOTES
"[facility]  Physical Therapy Initial Examination/Evaluation  July 14, 2021    Nate Rubio is a 40 year old  male referred to physical therapy by Dr. Coley for treatment of knee pain with Precautions/Restrictions/MD instructions none    KEY PT FINDINGS:  1.  Significant quadriceps atrophy on involved limb  2.  No joint effusion  3.  Check reign on lateral glide of patella     Subjective:  Referring MD visit date: 6/23/21  DOI/onset: a few weeks ago  Mechanism of injury: hyperextended knee while playing softball - felt like he \"tweaked\" it  DOS seven years ago - ACI and MPFL-R  Previous treatment: none  Imaging: none recently  Chief Complaint:   Once every few days, knee cracks, feels a bit unstable.  A little bit of swelling.   Pain: best 0 /10, worst 5/10 anterior Described as: discomfort Alleviated by: rest, icing Frequency: intermittent Progression of symptoms since initial onset: improving Time of day when pain is worse: not related  Sleeping: not affected  Social history:  , expecting first child    Occupation: teacher  Job duties:  Sitting, standing    Current HEP/exercise regimen: softball  Patient's goals are reduce instability    Pertinent PMH: None   General Health Reported by Patient: excellent  Return to MD:  PRN         Lower Extremity Exam    Dynamic Movement Screen:  2 leg stance: genu varum L>R  2 leg squat:Normal    1 leg stance:   Right: normal  Left: normal    1 leg squat:   Right: excessive femoral IR/ADD  Left: normal    Gait: WNL    Patellofemoral Joint Examination:  Test Right Left   Patellar Orientation:   90 deg flexion neutral neutral   OKC Patellar Tracking Increased lateralization into TKE Normal   Patellar Orientation:  0 deg flexion neutral neutral   Quadrant Mobility (Med:Lat) 1:1  1:1   Effusion 0 0   Quad Activation Fair - 15 cm proximal 43 cm Normal - 15 cm proximal 47.5 cm     Knee Joint AROM   Right Left Difference   Hyperextension 0 deg 4 deg " 4 deg   Extension 0 deg 0 deg 0 deg   Flexion 145 deg 145 deg 0 deg       Hip Joint PROM Screen   Right Left Difference   Flex 110 deg 110 deg 0 deg   ER 60 deg 60 deg 0 deg   IR 30 deg 30 deg 0 deg     Lower Extremity Muscle Strength (x/5)   Right Left   Hip ABD 5-/5 5-/5     Circumferential Measurements   quadriceps 15 cm proximal:  43 cm on R  47.5 on L    Patient is a 40 year old male with right side knee complaints.    Patient has the following significant findings with corresponding treatment plan.                Diagnosis 1:  Patellar instability - s/p ACI, MPFL-R     Pain -  hot/cold therapy, US, electric stimulation, manual therapy, splint/taping/bracing/orthotics, self management, education, and home program  Decreased ROM/flexibility - manual therapy and therapeutic exercise  Decreased joint mobility - manual therapy and therapeutic exercise  Decreased strength - therapeutic exercise and therapeutic activities  Impaired balance - neuro re-education and therapeutic activities  Decreased proprioception - neuro re-education and therapeutic activities  Inflammation - cold therapy  Edema - vasopneumatics  Impaired gait - gait training  Impaired muscle performance - neuro re-education  Decreased function - therapeutic activities    Therapy Evaluation Codes:   1) History comprised of:   Personal factors that impact the plan of care:      None.    Comorbidity factors that impact the plan of care are:      None.     Medications impacting care: None.  2) Examination of Body Systems comprised of:   Body structures and functions that impact the plan of care:      Knee.   Activity limitations that impact the plan of care are:      Squatting/kneeling, Stairs, Standing and Walking.  3) Clinical presentation characteristics are:   Stable/Uncomplicated.  4) Decision-Making    Low complexity using standardized patient assessment instrument and/or measureable assessment of functional outcome.  Cumulative Therapy Evaluation  is: Low complexity.    Previous and current functional limitations:  (See Goal Flow Sheet for this information)    Short term and Long term goals: (See Goal Flow Sheet for this information)     Communication ability:  Patient appears to be able to clearly communicate and understand verbal and written communication and follow directions correctly.  Treatment Explanation - The following has been discussed with the patient:   RX ordered/plan of care  Anticipated outcomes  Possible risks and side effects  This patient would benefit from PT intervention to resume normal activities.   Rehab potential is good.    Frequency:  2 X week, once daily  Duration:  for 4 weeks tapering to 1 X a week over 1 weeks  Discharge Plan:  Achieve all LTG.  Independent in home treatment program.  Reach maximal therapeutic benefit.    Please refer to the daily flowsheet for treatment today, total treatment time and time spent performing 1:1 timed codes.

## 2021-07-20 ENCOUNTER — THERAPY VISIT (OUTPATIENT)
Dept: PHYSICAL THERAPY | Facility: CLINIC | Age: 41
End: 2021-07-20
Payer: COMMERCIAL

## 2021-07-20 DIAGNOSIS — M25.561 ACUTE PAIN OF RIGHT KNEE: Primary | ICD-10-CM

## 2021-07-20 PROCEDURE — 97530 THERAPEUTIC ACTIVITIES: CPT | Mod: GP | Performed by: PHYSICAL THERAPIST

## 2021-07-20 PROCEDURE — 97110 THERAPEUTIC EXERCISES: CPT | Mod: GP | Performed by: PHYSICAL THERAPIST

## 2021-07-20 NOTE — PROGRESS NOTES
Date      7/20/21 Limb Occlusion Pressure    162 Percent (%) Occlusion    80 Training Occlusion Pressure    130 Exercises Performed      SAQ no weight 30/15/15/15    Rest    SL leg press 2.5pl 30/15/15/15   Total time under tourniquet    7:30        7:30   Immediate effects    9/10 (4th set RPE)        8/10 (4th set RPE) Lingering effects (from previous session)    n/a   NOTES: Initiated BFR training today. Screened patient for precautions/contraindications and determined patient is appropriate for therapeutic use of BFR. Risks and benefits were discussed and patient gave informed consent. MD gave ok for patient to use BFR.  NEXT: Add ankle weight to SAQ, keep leg press the same

## 2021-07-29 ENCOUNTER — THERAPY VISIT (OUTPATIENT)
Dept: PHYSICAL THERAPY | Facility: CLINIC | Age: 41
End: 2021-07-29
Payer: COMMERCIAL

## 2021-07-29 DIAGNOSIS — M25.561 ACUTE PAIN OF RIGHT KNEE: Primary | ICD-10-CM

## 2021-07-29 PROCEDURE — 97110 THERAPEUTIC EXERCISES: CPT | Mod: GP | Performed by: PHYSICAL THERAPIST

## 2021-07-29 PROCEDURE — 97530 THERAPEUTIC ACTIVITIES: CPT | Mod: GP | Performed by: PHYSICAL THERAPIST

## 2021-07-29 NOTE — PROGRESS NOTES
Date      7/29/21 Limb Occlusion Pressure    197 Percent (%) Occlusion    80 Training Occlusion Pressure    158 Exercises Performed      SAQ 1# 30/15/15/15    Rest    SL leg press 2.5pl 30/15/15/15   Total time under tourniquet    7:30        7:30   Immediate effects    8/10 (4th set RPE)        10/10 (4th set RPE) Lingering effects (from previous session)    Felt really good no lingering effects. Has been doing HEP every other day.   NOTES: Screened patient for precautions/contraindications and determined patient is appropriate for therapeutic use of BFR. Risks and benefits were discussed and patient gave informed consent. MD gave ok for patient to use BFR.

## 2021-09-06 ENCOUNTER — TELEPHONE (OUTPATIENT)
Dept: FAMILY MEDICINE | Facility: CLINIC | Age: 41
End: 2021-09-06

## 2021-09-06 NOTE — TELEPHONE ENCOUNTER
Reason for Call:  Other     Detailed comments:PT and his wife are wanting to do a Meet & Greet before baby comes middle of October. Wife name is Cam and she already put in a request and has not heard anything back     Phone Number Patient can be reached at: Cell number on file:    Telephone Information:   Mobile [phone]       Best Time: any    Can we leave a detailed message on this number? YES    Call taken on 9/6/2021 at 12:19 PM by Radha Page

## 2021-09-16 NOTE — TELEPHONE ENCOUNTER
Called Nate and scheduled meet and greet on 10/4/21 at 9:40 am with Dr. Lewis. They are also wondering if they could get information on circumcision at appointment. Due to wife's due date being close to mid-October they are wondering if earlier appointment is available. Routing to team to advise.  Edda Corona-  Radhika

## 2021-09-20 NOTE — TELEPHONE ENCOUNTER
Called and spoke with pt and was able to schedule pt for appt next Wednesday at 440pm.  Kristi Beltran MA

## 2021-09-29 ENCOUNTER — VIRTUAL VISIT (OUTPATIENT)
Dept: FAMILY MEDICINE | Facility: CLINIC | Age: 41
End: 2021-09-29
Payer: COMMERCIAL

## 2021-09-29 DIAGNOSIS — Z76.81 COUNSELING OF EXPECTANT PARENTS AT PEDIATRIC PRE-BIRTH VISIT: Primary | ICD-10-CM

## 2021-09-29 PROCEDURE — 99207 PR NON-BILLABLE SERV PER CHARTING: CPT | Mod: GT | Performed by: INTERNAL MEDICINE

## 2021-09-29 NOTE — PROGRESS NOTES
Nate is a 41 year old who is being evaluated via a billable video visit.    37 5/7  First baby  Big  Fibroid   Measure babies  IUGR  33- 34 weeks  10th percentile   Start: 09/29/2021 05:05 pm  Stop: 09/29/2021 05:26 pm  How would you like to obtain your AVS? MyChart  If the video visit is dropped, the invitation should be resent by: Text to cell phone: [phone]  Will anyone else be joining your video visit? No    Video Start Time: 5:05    Assessment & Plan   Problem List Items Addressed This Visit     None      Visit Diagnoses     Counseling of expectant parents at pediatric pre-birth visit    -  Primary         Reviewed information in regards to circumcision         Work on weight loss  Regular exercise    No follow-ups on file.    Logan Lewis MD  [facility]    Alessio Sullivan is a 41 year old who presents for the following health issues     HPI     Baby inducing tonight at 7pm at [facility]  OB Midwife [facility] team  Unknown sex  Discuss circumcision         Review of Systems   Constitutional, HEENT, cardiovascular, pulmonary, gi and gu systems are negative, except as otherwise noted.      Objective           Vitals:  No vitals were obtained today due to virtual visit.    Physical Exam   GENERAL: Healthy, alert and no distress  EYES: Eyes grossly normal to inspection.  No discharge or erythema, or obvious scleral/conjunctival abnormalities.  HENT: Normal cephalic/atraumatic.  External ears, nose and mouth without ulcers or lesions.  No nasal drainage visible.  NECK: No asymmetry, visible masses or scars  RESP: No audible wheeze, cough, or visible cyanosis.  No visible retractions or increased work of breathing.    SKIN: Visible skin clear. No significant rash, abnormal pigmentation or lesions.  NEURO: Cranial nerves grossly intact.  Mentation and speech appropriate for age.  PSYCH: Mentation appears normal, affect normal/bright, judgement and insight intact,  normal speech and appearance well-groomed.    No results found for any visits on 09/29/21.            Video-Visit Details    Type of service:  Video Visit    Video End Time:5:26 PM    Originating Location (pt. Location): Home    Distant Location (provider location):  [facility]     Platform used for Video Visit: [employer]

## 2021-10-02 ENCOUNTER — HEALTH MAINTENANCE LETTER (OUTPATIENT)
Age: 41
End: 2021-10-02

## 2022-02-18 ENCOUNTER — TELEPHONE (OUTPATIENT)
Dept: ORTHOPEDICS | Facility: CLINIC | Age: 42
End: 2022-02-18
Payer: COMMERCIAL

## 2022-02-18 DIAGNOSIS — M25.561 ACUTE PAIN OF RIGHT KNEE: Primary | ICD-10-CM

## 2022-02-18 NOTE — TELEPHONE ENCOUNTER
[facility] Call Center    Phone Message    May a detailed message be left on voicemail: yes     Reason for Call: Other: patient wants a call back to discuss ordering a MRI      Action Taken: Message routed to:  [facility]o    Travel Screening: Not Applicable     Patients knee cracking has came back and is worried that the cartilage replacement may be sushma off -- not any severe pain going on but just discomfort , we made a f/u with Brijesh 3/22  But he thinks he may need an MRI     Please place order and call patient back so he can schedule

## 2022-02-21 NOTE — TELEPHONE ENCOUNTER
[facility] LVM for patient in regards to follow up appointment with Dr. Coley on 3/22/22. Dr. Coley would like patient to start with new XR and evaluation prior to ordering an MRI.     Patient will need to arrive at appointment at 8:25am on 3/22/22 and check in on the 4th floor of the [facility].    Provided call back number [phone].    ANTOINETTE Stoddard

## 2022-03-22 ENCOUNTER — OFFICE VISIT (OUTPATIENT)
Dept: ORTHOPEDICS | Facility: CLINIC | Age: 42
End: 2022-03-22
Payer: COMMERCIAL

## 2022-03-22 ENCOUNTER — ANCILLARY PROCEDURE (OUTPATIENT)
Dept: GENERAL RADIOLOGY | Facility: CLINIC | Age: 42
End: 2022-03-22
Attending: ORTHOPAEDIC SURGERY
Payer: COMMERCIAL

## 2022-03-22 VITALS — HEIGHT: 71 IN | BODY MASS INDEX: 23.1 KG/M2 | WEIGHT: 165 LBS

## 2022-03-22 DIAGNOSIS — Z98.890 S/P RIGHT KNEE SURGERY: Primary | ICD-10-CM

## 2022-03-22 DIAGNOSIS — G89.29 CHRONIC PAIN OF RIGHT KNEE: ICD-10-CM

## 2022-03-22 DIAGNOSIS — M25.561 ACUTE PAIN OF RIGHT KNEE: ICD-10-CM

## 2022-03-22 DIAGNOSIS — M25.561 CHRONIC PAIN OF RIGHT KNEE: ICD-10-CM

## 2022-03-22 PROCEDURE — 99213 OFFICE O/P EST LOW 20 MIN: CPT | Performed by: ORTHOPAEDIC SURGERY

## 2022-03-22 PROCEDURE — 73562 X-RAY EXAM OF KNEE 3: CPT | Mod: RT | Performed by: RADIOLOGY

## 2022-03-22 RX ORDER — VENLAFAXINE HYDROCHLORIDE 150 MG/1
1 CAPSULE, EXTENDED RELEASE ORAL DAILY
COMMUNITY
Start: 2021-09-30 | End: 2022-12-20

## 2022-03-22 NOTE — LETTER
"    3/22/2022         RE: Nate Rubio  [address] [address]        Dear Colleague,    Thank you for referring your patient, Nate Rubio, to the [facility] [place]. Please see a copy of my visit note below.    Chief Complaint:  Postoperative visit, right knee    Date of Surgery:  02/14/2014    History of Present Illness:  Nate Rubio is a 41-year-old male who is now 8 years status post right medial patellofemoral ligament reconstruction with allograft and patellar autologous chondrocyte implantation.  He had been doing quite well until last summer.  He had a \"tweak\".  He did physical therapy and his symptoms improved.  In October of this year he had a baby.  He is noted some \"grinding\" with bending and crawling.  He describes his symptoms more as an ache over a true pain.  However 3 weeks ago he was playing pickle ball a note that sharp pain beneath his patella.  He has some clicking.  He notes some swelling.  He is concerned about his graft.  He denies any true patellar instability.    Physical Examination:  41-year-old male alert oriented no apparent distress.  Range of motion is 5/0/140 bilaterally.  The left knee has 2.5 quadrant lateral 2 quadrant medial translation.  The right knee has 2 quadrants lateral 2 quadrant medial translation.  The right knee has no apprehension.  The right knee does have patellofemoral crepitation and a positive patellofemoral grind test.  No Lachman.  No pivot shift.  No posterior drawer.  No opening to varus or valgus stress.    Radiographs: Right knee radiographs obtained today reveal no fracture or loose body.  Cartilage spaces are well-maintained.  Previous tunnels from the MPFL reconstruction are noted.  They appear to be in proper position.    Impression:  41-year-old male 8 years status post right medial patellofemoral ligament reconstruction and patellar chondrocyte implantation.  Patient has done well until " recently.  He has developed some sharp retropatellar pain.  We discussed treatment options.  We discussed obtaining advanced imaging.  We also discussed physical therapy.  I am a bit reluctant to do an injection at this point until we have more information.  Patient feels that the physical therapy would be helpful.  However he is very interested in an MRI scan to evaluate his patellar ACI graft.    Plan:  1. I did provide him with a referral to start physical therapy.  2. We will obtain an MRI to evaluate his patellar ACI graft  3. We will follow-up the results of his MRI with a phone call.      Sincerely,        Emmanuel Coley MD

## 2022-03-26 NOTE — PROGRESS NOTES
"Chief Complaint:  Postoperative visit, right knee    Date of Surgery:  02/14/2014    History of Present Illness:  Nate Rubio is a 41-year-old male who is now 8 years status post right medial patellofemoral ligament reconstruction with allograft and patellar autologous chondrocyte implantation.  He had been doing quite well until last summer.  He had a \"tweak\".  He did physical therapy and his symptoms improved.  In October of this year he had a baby.  He is noted some \"grinding\" with bending and crawling.  He describes his symptoms more as an ache over a true pain.  However 3 weeks ago he was playing pickle ball a note that sharp pain beneath his patella.  He has some clicking.  He notes some swelling.  He is concerned about his graft.  He denies any true patellar instability.    Physical Examination:  41-year-old male alert oriented no apparent distress.  Range of motion is 5/0/140 bilaterally.  The left knee has 2.5 quadrant lateral 2 quadrant medial translation.  The right knee has 2 quadrants lateral 2 quadrant medial translation.  The right knee has no apprehension.  The right knee does have patellofemoral crepitation and a positive patellofemoral grind test.  No Lachman.  No pivot shift.  No posterior drawer.  No opening to varus or valgus stress.    Radiographs: Right knee radiographs obtained today reveal no fracture or loose body.  Cartilage spaces are well-maintained.  Previous tunnels from the MPFL reconstruction are noted.  They appear to be in proper position.    Impression:  41-year-old male 8 years status post right medial patellofemoral ligament reconstruction and patellar chondrocyte implantation.  Patient has done well until recently.  He has developed some sharp retropatellar pain.  We discussed treatment options.  We discussed obtaining advanced imaging.  We also discussed physical therapy.  I am a bit reluctant to do an injection at this point until we have more information.  Patient feels " that the physical therapy would be helpful.  However he is very interested in an MRI scan to evaluate his patellar ACI graft.    Plan:  1. I did provide him with a referral to start physical therapy.  2. We will obtain an MRI to evaluate his patellar ACI graft  3. We will follow-up the results of his MRI with a phone call.

## 2022-05-13 ENCOUNTER — VIRTUAL VISIT (OUTPATIENT)
Dept: FAMILY MEDICINE | Facility: CLINIC | Age: 42
End: 2022-05-13
Payer: COMMERCIAL

## 2022-05-13 ENCOUNTER — LAB (OUTPATIENT)
Dept: URGENT CARE | Facility: URGENT CARE | Age: 42
End: 2022-05-13
Attending: NURSE PRACTITIONER
Payer: COMMERCIAL

## 2022-05-13 ENCOUNTER — TELEPHONE (OUTPATIENT)
Dept: FAMILY MEDICINE | Facility: CLINIC | Age: 42
End: 2022-05-13

## 2022-05-13 DIAGNOSIS — J02.9 SORE THROAT: Primary | ICD-10-CM

## 2022-05-13 DIAGNOSIS — J02.9 SORE THROAT: ICD-10-CM

## 2022-05-13 PROBLEM — R79.89 ELEVATED TSH: Status: ACTIVE | Noted: 2018-07-09

## 2022-05-13 PROBLEM — R73.9 HYPERGLYCEMIA: Status: ACTIVE | Noted: 2021-04-26

## 2022-05-13 PROBLEM — Z98.890 S/P RIGHT KNEE SURGERY: Status: ACTIVE | Noted: 2018-07-09

## 2022-05-13 PROBLEM — F41.9 ANXIETY DISORDER: Status: ACTIVE | Noted: 2017-08-02

## 2022-05-13 PROBLEM — E78.5 HYPERLIPIDEMIA: Status: ACTIVE | Noted: 2021-04-26

## 2022-05-13 LAB
DEPRECATED S PYO AG THROAT QL EIA: NEGATIVE
GROUP A STREP BY PCR: NOT DETECTED

## 2022-05-13 PROCEDURE — 99213 OFFICE O/P EST LOW 20 MIN: CPT | Mod: GT | Performed by: NURSE PRACTITIONER

## 2022-05-13 PROCEDURE — U0003 INFECTIOUS AGENT DETECTION BY NUCLEIC ACID (DNA OR RNA); SEVERE ACUTE RESPIRATORY SYNDROME CORONAVIRUS 2 (SARS-COV-2) (CORONAVIRUS DISEASE [COVID-19]), AMPLIFIED PROBE TECHNIQUE, MAKING USE OF HIGH THROUGHPUT TECHNOLOGIES AS DESCRIBED BY CMS-2020-01-R: HCPCS

## 2022-05-13 PROCEDURE — 87651 STREP A DNA AMP PROBE: CPT

## 2022-05-13 PROCEDURE — U0005 INFEC AGEN DETEC AMPLI PROBE: HCPCS

## 2022-05-13 RX ORDER — SILDENAFIL CITRATE 20 MG/1
TABLET ORAL
COMMUNITY
Start: 2022-04-03 | End: 2023-06-22

## 2022-05-13 NOTE — PROGRESS NOTES
COVID-19 PCR,Strep tests completed. Patient handout For Patients Who Have Been Tested for Covid-19 (Coronavirus) was given to the patient, which includes test result notification process.

## 2022-05-13 NOTE — PROGRESS NOTES
Nate is a 41 year old who is being evaluated via a billable video visit.      How would you like to obtain your AVS? MyChart  If the video visit is dropped, the invitation should be resent by: Text to cell phone: [phone]  Will anyone else be joining your video visit? No      Video Start Time: 10:38 AM    Assessment & Plan     Sore throat  Continue supportive cares, schedule curbside testing. Reviewed warning signs and when to see further care.  - Symptomatic; Yes; 5/12/2022 COVID-19 Virus (Coronavirus) by PCR Nose; Future  - Streptococcus A Rapid Screen w/Reflex to PCR; Future    Ordering of each unique test         See Patient Instructions    No follow-ups on file.    DAYA [facility]    Alessio Sullivan is a 41 year old who presents for the following health issues     HPI     Acute Illness    Onset/Duration: 1-2 days  Symptoms:  Fever: unknown  Chills/Sweats: no  Headache (location?): YES  Sinus Pressure: YES  Conjunctivitis:  no  Ear Pain: no  Rhinorrhea: no  Congestion: YES  Sore Throat: YES  Cough: YES- slight  Wheeze: no  Decreased Appetite: no  Nausea: no  Vomiting: no  Diarrhea: no  Dysuria/Freq.: no  Dysuria or Hematuria: no  Fatigue/Achiness: YES  Sick/Strep Exposure: YES  Therapies tried and outcome: Ibuprofen and Tylenol, Nyquil    Works in school.  Moderate pain- can eat and drink ok.   Did home antigen test yesterday and this morning and negative. Fully vaccinated for COVID-19.  No fever- temp 97      Review of Systems   Constitutional, HEENT, cardiovascular, pulmonary, gi and gu systems are negative, except as otherwise noted.      Objective           Vitals:  No vitals were obtained today due to virtual visit.    Physical Exam   GENERAL: Healthy, alert and no distress  EYES: Eyes grossly normal to inspection.  No discharge or erythema, or obvious scleral/conjunctival abnormalities.  RESP: No audible wheeze, cough, or visible cyanosis.  No  visible retractions or increased work of breathing.    SKIN: Visible skin clear. No significant rash, abnormal pigmentation or lesions.  NEURO: Cranial nerves grossly intact.  Mentation and speech appropriate for age.  PSYCH: Mentation appears normal, affect normal/bright, judgement and insight intact, normal speech and appearance well-groomed.                Video-Visit Details    Type of service:  Video Visit    Video End Time:10:45 AM    Originating Location (pt. Location): Home    Distant Location (provider location):  [facility]     Platform used for Video Visit: [employer]

## 2022-05-13 NOTE — PATIENT INSTRUCTIONS
Instructions for Patients  It is recommended that you have a test for coronavirus (COVID-19).  If you have symptoms, follow the self-isolation guidelines in these instructions. If you have been exposed but do not have symptoms, follow the quarantine guidelines.     Please follow these steps:    1. Please sign in to, or sign up for, [employer] to fill out a symptom .  2. Once finished, you can schedule a COVID test online.  3. If you don't have "[employer]"t, please call [phone] ([phone])  4. Our testing team will send you your test results, usually within 2 to 3 days. You can also see your results in [employer].      What are the symptoms of COVID-19?  Symptoms can include fever, cough, shortness of breath, chills, headache, muscle pain sore throat, fatigue, runny or stuffy nose, and loss of taste and smell. Other less common symptoms include nausea, vomiting, or diarrhea (watery stools).    Know when to call 911. Emergency warning signs include:    Trouble breathing or shortness of breath    Pain or pressure in the chest that doesn't go away    Feeling confused like you haven't felt before, or not being able to wake up    Bluish-colored lips or face    How can I take care of myself?  1. Get lots of rest. Drink extra fluids (unless a doctor has told you not to).  2. Take Tylenol (acetaminophen) for fever or pain. If you have liver or kidney problems, ask your family doctor if it's okay to take Tylenol   Adults:   650 mg (two 325 mg pills or tablets) every 4 to 6 hours, or...   1,000 mg (two 500 mg pills or tablets) every 8 hours as needed.  Note: Don't take more than 3,000 mg in one day. Acetaminophen is found in many medicines (both prescribed and over the counter). Read all labels to be sure you don't take too much.  For children, check the Tylenol bottle for the right dose. The dose is based on the child's age or weight.  3. Take over the counter medicines for your symptoms as needed. Talk to your  pharmacist.  4. If you have other health problems (like cancer, heart failure, an organ transplant, or severe kidney disease): Call your specialty clinic if you don't feel better in the next 2 days.    These guidelines are for isolating and quarantining before returning to work, school or .     For employers, schools and day cares: This is an official notice for this person s medical guidelines for returning in-person.     For health care sites: The CDC gives different isolation and quarantine guidelines for healthcare sites, please check with these sites before arriving.     How do I self-isolate?  You isolate when you have symptoms of COVID or a test shows you have COVID, even if you don t have symptoms.     If you DO have symptoms:  o Stay home and away from others  - For at least 5 days after your symptoms started, AND   - You are fever free for 24 hours (with no medicine that reduces fever), AND  - Your other symptoms are better.  o Wear a mask for 10 full days any time you are around others.    If you DON T have symptoms:  o Stay at home and away from others for at least 5 days after your positive test.  o Wear a mask for 10 full days any time you are around others.    How and when do I quarantine?  You quarantine when you may have been exposed to the virus and DON T have symptoms.     Stay home and away from others.     You must quarantine for 5 days after your last contact with a person who has COVID.  o Note: If you are fully vaccinated, you don t need to quarantine. You should still follow the steps below.     Wear a mask for 10 full days any time you re around others.    Get tested at least 5 days after you were exposed, even if you don t have symptoms.     If you start to have symptoms, isolate right away and get tested.    Where can I get more information?    [facility] COVID-19 Resource Hub: [email or web address]     CDC Quarantine & Isolation:  https://www.cdc.gov/coronavirus/2019-ncov/your-health/quarantine-isolation.html     CDC - What to Do If You're Sick: https://www.cdc.gov/coronavirus/2019-ncov/if-you-are-sick/index.html    [facility] clinical trials (COVID-19 research studies): clinicalaffairs.[facility]/n-clinical-trials    [place] Department of Health COVID-19 Public Hotline: 1-580.377.9091

## 2022-05-14 ENCOUNTER — HEALTH MAINTENANCE LETTER (OUTPATIENT)
Age: 42
End: 2022-05-14

## 2022-05-14 LAB — SARS-COV-2 RNA RESP QL NAA+PROBE: NEGATIVE

## 2022-05-18 ENCOUNTER — OFFICE VISIT (OUTPATIENT)
Dept: FAMILY MEDICINE | Facility: CLINIC | Age: 42
End: 2022-05-18

## 2022-05-18 VITALS
WEIGHT: 165 LBS | TEMPERATURE: 98 F | RESPIRATION RATE: 16 BRPM | HEART RATE: 69 BPM | BODY MASS INDEX: 23.1 KG/M2 | DIASTOLIC BLOOD PRESSURE: 76 MMHG | OXYGEN SATURATION: 98 % | HEIGHT: 71 IN | SYSTOLIC BLOOD PRESSURE: 128 MMHG

## 2022-05-18 DIAGNOSIS — J03.90 TONSILLITIS: Primary | ICD-10-CM

## 2022-05-18 PROCEDURE — 99213 OFFICE O/P EST LOW 20 MIN: CPT | Performed by: FAMILY MEDICINE

## 2022-05-18 RX ORDER — AMOXICILLIN 875 MG
875 TABLET ORAL 2 TIMES DAILY
Qty: 20 TABLET | Refills: 0 | Status: SHIPPED | OUTPATIENT
Start: 2022-05-18 | End: 2022-05-28

## 2022-05-18 RX ORDER — PREDNISONE 20 MG/1
TABLET ORAL
Qty: 3 TABLET | Refills: 0 | Status: SHIPPED | OUTPATIENT
Start: 2022-05-18 | End: 2023-06-22

## 2022-05-18 ASSESSMENT — PAIN SCALES - GENERAL: PAINLEVEL: EXTREME PAIN (8)

## 2022-05-18 NOTE — PROGRESS NOTES
"  Assessment & Plan     Tonsillitis  Negative for strep, negative COVID test.  Been having pain for over a week now.  Advised patient with supportive care, gargle with warm water and salt.  Discussed with patient possible tonsillitis.    Treated with prednisone and amoxicillin  - predniSONE (DELTASONE) 20 MG tablet; One tab daily for 3 days  - amoxicillin (AMOXIL) 875 MG tablet; Take 1 tablet (875 mg) by mouth 2 times daily for 10 days      No follow-ups on file.    Lisbet Saha MD  [facility]    Alessio Sullivan is a 41 year old who presents for the following health issues:  Severe tonsil pain for over a week now, negative for COVID test, strep test.  Has no headache, no fever, no chills.  No abdominal pain no nausea no vomiting.  Been taking ibuprofen and Tylenol.  Patient non-smoker, does not drink alcohol.    No sick contact.    HPI     Acute Illness  Acute illness concerns: Sore throat   Onset/Duration: 7 days  Symptoms:  Fever: no  Chills/Sweats: no  Headache (location?): YES  Sinus Pressure: YES  Conjunctivitis:  no  Ear Pain: YES- a little  Rhinorrhea: YES  Congestion: no  Sore Throat: YES- swollen gland right side  Cough: YES-non-productive  Wheeze: no  Decreased Appetite: no  Nausea: no  Vomiting: no  Diarrhea: no  Dysuria/Freq.: no  Dysuria or Hematuria: no  Fatigue/Achiness: no  Sick/Strep Exposure: no  Therapies tried and outcome: Ibu, Tylenol, dayquil, nyquil-- negative strep/covid 5/13/22      Review of Systems   Constitutional, HEENT, cardiovascular, pulmonary, gi and gu systems are negative, except as otherwise noted.      Objective    /76 (BP Location: Right arm, Patient Position: Sitting, Cuff Size: Adult Regular)   Pulse 69   Temp 98  F (36.7  C) (Oral)   Resp 16   Ht 1.803 m (5' 11\")   Wt 74.8 kg (165 lb)   SpO2 98%   BMI 23.01 kg/m    Body mass index is 23.01 kg/m .  Physical Exam   GENERAL: healthy, alert and no distress  HENT: normal " cephalic/atraumatic, ear canals and TM's normal and   tonsillar erythema,right sided  NECK: no adenopathy, no asymmetry, masses, or scars and thyroid normal to palpation  RESP: lungs clear to auscultation - no rales, rhonchi or wheezes  PSYCH: mentation appears normal, affect normal/bright    Negative for Rapid strep and COVID test.    Lisbet Saha MD

## 2022-09-03 ENCOUNTER — HEALTH MAINTENANCE LETTER (OUTPATIENT)
Age: 42
End: 2022-09-03

## 2022-12-20 ENCOUNTER — VIRTUAL VISIT (OUTPATIENT)
Dept: FAMILY MEDICINE | Facility: CLINIC | Age: 42
End: 2022-12-20
Payer: COMMERCIAL

## 2022-12-20 DIAGNOSIS — F43.22 ADJUSTMENT DISORDER WITH ANXIETY: Primary | ICD-10-CM

## 2022-12-20 PROCEDURE — 99213 OFFICE O/P EST LOW 20 MIN: CPT | Mod: GT | Performed by: NURSE PRACTITIONER

## 2022-12-20 RX ORDER — VENLAFAXINE HYDROCHLORIDE 150 MG/1
150 CAPSULE, EXTENDED RELEASE ORAL DAILY
Qty: 90 CAPSULE | Refills: 1 | Status: SHIPPED | OUTPATIENT
Start: 2022-12-20 | End: 2023-06-14

## 2022-12-20 RX ORDER — SILDENAFIL CITRATE 20 MG/1
20-100 TABLET ORAL EVERY 24 HOURS
COMMUNITY
Start: 2022-11-03 | End: 2022-12-20

## 2022-12-20 ASSESSMENT — ANXIETY QUESTIONNAIRES
1. FEELING NERVOUS, ANXIOUS, OR ON EDGE: NOT AT ALL
2. NOT BEING ABLE TO STOP OR CONTROL WORRYING: NOT AT ALL
7. FEELING AFRAID AS IF SOMETHING AWFUL MIGHT HAPPEN: NOT AT ALL
6. BECOMING EASILY ANNOYED OR IRRITABLE: SEVERAL DAYS
3. WORRYING TOO MUCH ABOUT DIFFERENT THINGS: NOT AT ALL
GAD7 TOTAL SCORE: 1
GAD7 TOTAL SCORE: 1
5. BEING SO RESTLESS THAT IT IS HARD TO SIT STILL: NOT AT ALL

## 2022-12-20 ASSESSMENT — PATIENT HEALTH QUESTIONNAIRE - PHQ9
SUM OF ALL RESPONSES TO PHQ QUESTIONS 1-9: 2
5. POOR APPETITE OR OVEREATING: NOT AT ALL

## 2022-12-20 NOTE — PROGRESS NOTES
"Nate is a 42 year old who is being evaluated via a billable video visit.      How would you like to obtain your AVS? MyChart  If the video visit is dropped, the invitation should be resent by: Text to cell phone: [phone]  Will anyone else be joining your video visit? No          Assessment & Plan     Adjustment disorder with anxiety  Tolerating venlafaxine well without significant side effects.  Patient would like to continue on medication as he has found this helpful.  Refills provided today.  - venlafaxine (EFFEXOR XR) 150 MG 24 hr capsule; Take 1 capsule (150 mg) by mouth daily         Return in about 6 months (around 6/20/2023) for anxieyt follow up.    DAYA Jain CNP  [facility]    Alessio Sullivan is a 42 year old, presenting for the following health issues:  Anxiety      HPI     Medication Followup of Venlafaxine    Taking Medication as prescribed: yes    Side Effects:  Has some abdominal weight gain since starting medication, sometimes gets a \"brain fog/disoreineted\" feeling but believes its due to missing a dose of the medication    Medication Helping Symptoms:  yes    Overall tolerating the medication well. He was initially started on the medication for relation to his anxiety and had a panic attack he is continuing to find benefit from the medication and would like to continue.     Social History     Tobacco Use     Smoking status: Never     Smokeless tobacco: Never   Vaping Use     Vaping Use: Never used   Substance Use Topics     Alcohol use: Yes     Comment: occas., 6 per week     Drug use: No     VA-7 SCORE 12/20/2022   Total Score 1     PHQ 12/20/2022   PHQ-9 Total Score 2   Q9: Thoughts of better off dead/self-harm past 2 weeks Not at all     Last PHQ-9 12/20/2022   1.  Little interest or pleasure in doing things 0   2.  Feeling down, depressed, or hopeless 0   3.  Trouble falling or staying asleep, or sleeping too much 0   4.  Feeling tired or having " little energy 0   5.  Poor appetite or overeating 2   6.  Feeling bad about yourself 0   7.  Trouble concentrating 0   8.  Moving slowly or restless 0   Q9: Thoughts of better off dead/self-harm past 2 weeks 0   PHQ-9 Total Score 2     VA-7  12/20/2022   1. Feeling nervous, anxious, or on edge 0   2. Not being able to stop or control worrying 0   3. Worrying too much about different things 0   4. Trouble relaxing 0   5. Being so restless that it is hard to sit still 0   6. Becoming easily annoyed or irritable 1   7. Feeling afraid, as if something awful might happen 0   VA-7 Total Score 1         Review of Systems   Constitutional, HEENT, cardiovascular, pulmonary, gi and gu systems are negative, except as otherwise noted.      Objective           Vitals:  No vitals were obtained today due to virtual visit.    Physical Exam   GENERAL: Healthy, alert and no distress  EYES: Eyes grossly normal to inspection.  No discharge or erythema, or obvious scleral/conjunctival abnormalities.  RESP: No audible wheeze, cough, or visible cyanosis.  No visible retractions or increased work of breathing.    SKIN: Visible skin clear. No significant rash, abnormal pigmentation or lesions.  NEURO: Cranial nerves grossly intact.  Mentation and speech appropriate for age.  PSYCH: Mentation appears normal, affect normal/bright, judgement and insight intact, normal speech and appearance well-groomed.            Video-Visit Details    Video Start Time: 2:51 PM    Type of service:  Video Visit    Video End Time:3:06 PM    Originating Location (pt. Location): Home        Distant Location (provider location):  On-site    Platform used for Video Visit: [employer]

## 2023-01-24 ENCOUNTER — IMMUNIZATION (OUTPATIENT)
Dept: FAMILY MEDICINE | Facility: CLINIC | Age: 43
End: 2023-01-24
Payer: COMMERCIAL

## 2023-01-24 DIAGNOSIS — Z23 NEED FOR PROPHYLACTIC VACCINATION AND INOCULATION AGAINST INFLUENZA: ICD-10-CM

## 2023-01-24 DIAGNOSIS — Z23 HIGH PRIORITY FOR 2019-NCOV VACCINE: ICD-10-CM

## 2023-01-24 PROCEDURE — 90471 IMMUNIZATION ADMIN: CPT

## 2023-01-24 PROCEDURE — 91312 COVID-19 VACCINE BIVALENT BOOSTER 12+ (PFIZER): CPT

## 2023-01-24 PROCEDURE — 0124A COVID-19 VACCINE BIVALENT BOOSTER 12+ (PFIZER): CPT

## 2023-01-24 PROCEDURE — 90686 IIV4 VACC NO PRSV 0.5 ML IM: CPT

## 2023-06-03 ENCOUNTER — HEALTH MAINTENANCE LETTER (OUTPATIENT)
Age: 43
End: 2023-06-03

## 2023-06-12 ENCOUNTER — NURSE TRIAGE (OUTPATIENT)
Dept: PEDIATRICS | Facility: CLINIC | Age: 43
End: 2023-06-12
Payer: COMMERCIAL

## 2023-06-12 NOTE — TELEPHONE ENCOUNTER
S-(situation): Patient calling with concerns with elevated HR.     B-(background): Patient first noticed this occurring about 1 week ago. Patient is attributing symptoms to emotional stress. Patient has an apple watch at home to check HR, but it is not charged.     A-(assessment): Current HR = 65bpm checked while on phone with RN. Patient notes he has experienced elevated HR sporadically, seems to come in waves. Seems to last 5-10 seconds, can occur a few times during the course of the day. Last occurred this morning in the past half hour. Patient does not know what his HR was at the time of elevated, noted it feels like his heart is beating harder. Denies skipped or extra beats. Patient notes last week when this occurred, he felt lightheadedness, no lightheadedness since then. Patient is attributing symptoms to emotional stress.     R-(recommendations): Reviewed care advice under care tab. RN advised to charge apple watch and keep a log of HR readings. RN advised if experiencing symptoms (dizziness, chest pain) with elevated HR to be seen sooner. Patient verbalized understanding and agreed with plan.     Reason for Disposition    Problems with anxiety or stress    Additional Information    Negative: Taking water pill (i.e., diuretic) or heart medication (e.g., digoxin)    Negative: Age > 60 years  (Exception: Brief heartbeat symptoms that went away and now feels well.)    Negative: History of heart disease (i.e., heart attack, bypass surgery, angina, angioplasty)  (Exception: Brief heartbeat symptoms that went away and now feels well.)    Negative: Skipped or extra beat(s) and occurs 4 or more times per minute    Negative: Skipped or extra beat(s) and increases with exercise or exertion    Negative: Heart beating very rapidly (e.g., > 140 / minute) and not present now  (Exception: During exercise.)    Negative: Wearing a 'Holter monitor' or 'cardiac event monitor'    Negative: Received SHOCK from implantable  cardiac defibrillator (and now feels well)    Negative: New or worsened shortness of breath with activity (dyspnea on exertion)    Negative: Patient sounds very sick or weak to the triager    Negative: Difficulty breathing    Negative: Dizziness, lightheadedness, or weakness    Negative: Heart beating very rapidly (e.g., > 140 / minute) and present now  (Exception: During exercise.)    Negative: Heart beating very slowly (e.g., < 50 / minute)  (Exception: Athlete and heart rate normal for caller.)    Negative: Chest pain    Negative: Passed out (i.e., lost consciousness, collapsed and was not responding)    Negative: Shock suspected (e.g., cold/pale/clammy skin, too weak to stand, low BP, rapid pulse)    Negative: Difficult to awaken or acting confused (e.g., disoriented, slurred speech)    Negative: Visible sweat on face or sweat dripping down face    Negative: Unable to walk, or can only walk with assistance (e.g., requires support)    Negative: Received SHOCK from implantable cardiac defibrillator and has persisting symptoms (i.e., palpitations, lightheadedness)    Negative: Dizziness, lightheadedness, or weakness and heart beating very rapidly (e.g., > 140 / minute)    Negative: Dizziness, lightheadedness, or weakness and heart beating very slowly (e.g., < 50 / minute)    Negative: Sounds like a life-threatening emergency to the triager    Negative: History of hyperthyroidism or taking thyroid medication    Negative: Substance use (drug use) or misuse, known or suspected    Negative: ADHD and taking stimulant medication    Negative: Palpitations and no improvement after following Care Advice    Negative: Heart rhythm alert (e.g., 'you have irregular heartbeat') from personal wearable device (e.g., Apple Watch)    Negative: Patient wants to be seen    Protocols used: HEART RATE AND HEARTBEAT [id number] RN 6/12/2023 at 8:45 AM

## 2023-06-22 ENCOUNTER — OFFICE VISIT (OUTPATIENT)
Dept: FAMILY MEDICINE | Facility: CLINIC | Age: 43
End: 2023-06-22
Payer: COMMERCIAL

## 2023-06-22 VITALS
HEIGHT: 71 IN | OXYGEN SATURATION: 96 % | SYSTOLIC BLOOD PRESSURE: 110 MMHG | RESPIRATION RATE: 18 BRPM | TEMPERATURE: 97 F | HEART RATE: 62 BPM | BODY MASS INDEX: 25.06 KG/M2 | DIASTOLIC BLOOD PRESSURE: 75 MMHG | WEIGHT: 179 LBS

## 2023-06-22 DIAGNOSIS — Z76.89 ENCOUNTER TO ESTABLISH CARE: Primary | ICD-10-CM

## 2023-06-22 DIAGNOSIS — Z11.59 NEED FOR HEPATITIS C SCREENING TEST: ICD-10-CM

## 2023-06-22 DIAGNOSIS — R79.89 ELEVATED TSH: ICD-10-CM

## 2023-06-22 DIAGNOSIS — N52.8 OTHER MALE ERECTILE DYSFUNCTION: ICD-10-CM

## 2023-06-22 DIAGNOSIS — R00.2 PALPITATIONS: ICD-10-CM

## 2023-06-22 DIAGNOSIS — N52.9 ERECTILE DYSFUNCTION, UNSPECIFIED ERECTILE DYSFUNCTION TYPE: ICD-10-CM

## 2023-06-22 DIAGNOSIS — E78.5 HYPERLIPIDEMIA, UNSPECIFIED HYPERLIPIDEMIA TYPE: ICD-10-CM

## 2023-06-22 DIAGNOSIS — F41.1 GENERALIZED ANXIETY DISORDER: ICD-10-CM

## 2023-06-22 DIAGNOSIS — Z11.4 SCREENING FOR HIV (HUMAN IMMUNODEFICIENCY VIRUS): ICD-10-CM

## 2023-06-22 LAB
ALBUMIN SERPL BCG-MCNC: 4.6 G/DL (ref 3.5–5.2)
ALP SERPL-CCNC: 59 U/L (ref 40–129)
ALT SERPL W P-5'-P-CCNC: 29 U/L (ref 0–70)
ANION GAP SERPL CALCULATED.3IONS-SCNC: 10 MMOL/L (ref 7–15)
AST SERPL W P-5'-P-CCNC: 29 U/L (ref 0–45)
BILIRUB SERPL-MCNC: 0.5 MG/DL
BUN SERPL-MCNC: 15.2 MG/DL (ref 6–20)
CALCIUM SERPL-MCNC: 9.8 MG/DL (ref 8.6–10)
CHLORIDE SERPL-SCNC: 104 MMOL/L (ref 98–107)
CHOLEST SERPL-MCNC: 249 MG/DL
CREAT SERPL-MCNC: 0.87 MG/DL (ref 0.67–1.17)
DEPRECATED HCO3 PLAS-SCNC: 26 MMOL/L (ref 22–29)
ERYTHROCYTE [DISTWIDTH] IN BLOOD BY AUTOMATED COUNT: 11.4 % (ref 10–15)
GFR SERPL CREATININE-BSD FRML MDRD: >90 ML/MIN/1.73M2
GLUCOSE SERPL-MCNC: 102 MG/DL (ref 70–99)
HCT VFR BLD AUTO: 43.2 % (ref 40–53)
HCV AB SERPL QL IA: NONREACTIVE
HDLC SERPL-MCNC: 44 MG/DL
HGB BLD-MCNC: 15.2 G/DL (ref 13.3–17.7)
HIV 1+2 AB+HIV1 P24 AG SERPL QL IA: NONREACTIVE
LDLC SERPL CALC-MCNC: 149 MG/DL
MCH RBC QN AUTO: 33.2 PG (ref 26.5–33)
MCHC RBC AUTO-ENTMCNC: 35.2 G/DL (ref 31.5–36.5)
MCV RBC AUTO: 94 FL (ref 78–100)
NONHDLC SERPL-MCNC: 205 MG/DL
PLATELET # BLD AUTO: 218 10E3/UL (ref 150–450)
POTASSIUM SERPL-SCNC: 4.4 MMOL/L (ref 3.4–5.3)
PROT SERPL-MCNC: 7 G/DL (ref 6.4–8.3)
RBC # BLD AUTO: 4.58 10E6/UL (ref 4.4–5.9)
SODIUM SERPL-SCNC: 140 MMOL/L (ref 136–145)
T4 FREE SERPL-MCNC: 1.01 NG/DL (ref 0.9–1.7)
TRIGL SERPL-MCNC: 279 MG/DL
TSH SERPL DL<=0.005 MIU/L-ACNC: 5.02 UIU/ML (ref 0.3–4.2)
WBC # BLD AUTO: 4.6 10E3/UL (ref 4–11)

## 2023-06-22 PROCEDURE — 80053 COMPREHEN METABOLIC PANEL: CPT | Performed by: NURSE PRACTITIONER

## 2023-06-22 PROCEDURE — 93000 ELECTROCARDIOGRAM COMPLETE: CPT | Performed by: NURSE PRACTITIONER

## 2023-06-22 PROCEDURE — 36415 COLL VENOUS BLD VENIPUNCTURE: CPT | Performed by: NURSE PRACTITIONER

## 2023-06-22 PROCEDURE — 84439 ASSAY OF FREE THYROXINE: CPT | Performed by: NURSE PRACTITIONER

## 2023-06-22 PROCEDURE — 86803 HEPATITIS C AB TEST: CPT | Performed by: NURSE PRACTITIONER

## 2023-06-22 PROCEDURE — 85027 COMPLETE CBC AUTOMATED: CPT | Performed by: NURSE PRACTITIONER

## 2023-06-22 PROCEDURE — 99214 OFFICE O/P EST MOD 30 MIN: CPT | Performed by: NURSE PRACTITIONER

## 2023-06-22 PROCEDURE — 84443 ASSAY THYROID STIM HORMONE: CPT | Performed by: NURSE PRACTITIONER

## 2023-06-22 PROCEDURE — 96127 BRIEF EMOTIONAL/BEHAV ASSMT: CPT | Performed by: NURSE PRACTITIONER

## 2023-06-22 PROCEDURE — 87389 HIV-1 AG W/HIV-1&-2 AB AG IA: CPT | Performed by: NURSE PRACTITIONER

## 2023-06-22 PROCEDURE — 80061 LIPID PANEL: CPT | Performed by: NURSE PRACTITIONER

## 2023-06-22 RX ORDER — SILDENAFIL CITRATE 20 MG/1
20 TABLET ORAL DAILY PRN
Qty: 30 TABLET | Refills: 3 | Status: SHIPPED | OUTPATIENT
Start: 2023-06-22 | End: 2024-08-22

## 2023-06-22 ASSESSMENT — PAIN SCALES - GENERAL: PAINLEVEL: NO PAIN (0)

## 2023-06-22 ASSESSMENT — PATIENT HEALTH QUESTIONNAIRE - PHQ9
SUM OF ALL RESPONSES TO PHQ QUESTIONS 1-9: 0
SUM OF ALL RESPONSES TO PHQ QUESTIONS 1-9: 0

## 2023-06-22 ASSESSMENT — ANXIETY QUESTIONNAIRES
7. FEELING AFRAID AS IF SOMETHING AWFUL MIGHT HAPPEN: NOT AT ALL
IF YOU CHECKED OFF ANY PROBLEMS ON THIS QUESTIONNAIRE, HOW DIFFICULT HAVE THESE PROBLEMS MADE IT FOR YOU TO DO YOUR WORK, TAKE CARE OF THINGS AT HOME, OR GET ALONG WITH OTHER PEOPLE: NOT DIFFICULT AT ALL
GAD7 TOTAL SCORE: 2
1. FEELING NERVOUS, ANXIOUS, OR ON EDGE: SEVERAL DAYS
6. BECOMING EASILY ANNOYED OR IRRITABLE: SEVERAL DAYS
3. WORRYING TOO MUCH ABOUT DIFFERENT THINGS: NOT AT ALL
8. IF YOU CHECKED OFF ANY PROBLEMS, HOW DIFFICULT HAVE THESE MADE IT FOR YOU TO DO YOUR WORK, TAKE CARE OF THINGS AT HOME, OR GET ALONG WITH OTHER PEOPLE?: NOT DIFFICULT AT ALL
5. BEING SO RESTLESS THAT IT IS HARD TO SIT STILL: NOT AT ALL
4. TROUBLE RELAXING: NOT AT ALL
GAD7 TOTAL SCORE: 2
GAD7 TOTAL SCORE: 2
2. NOT BEING ABLE TO STOP OR CONTROL WORRYING: NOT AT ALL
7. FEELING AFRAID AS IF SOMETHING AWFUL MIGHT HAPPEN: NOT AT ALL

## 2023-06-22 NOTE — PATIENT INSTRUCTIONS
EKG normal today which is reassuring.     Labs today to rule out other concerns.     If symptoms return, please follow-up and we can do a ziopatch (monitor for a week or two).     Stay hydrated.

## 2023-06-22 NOTE — PROGRESS NOTES
Assessment & Plan     Encounter to establish care    Palpitations  EKG and exam were normal today. Labs ordered. Possible abnormal thyroid function given hx of subclinical hypothyroidism. Discussed ziopatch option. He would like to get lab results done first and then discuss that if needed which I agree is a good plan.     - EKG 12-lead complete w/read - Clinics  - Comprehensive metabolic panel (BMP + Alb, Alk Phos, ALT, AST, Total. Bili, TP)  - CBC with platelets  - TSH with free T4 reflex    Hyperlipidemia, unspecified hyperlipidemia type  Labs ordered. Not fasting today.     - Lipid panel reflex to direct LDL Non-fasting    Erectile dysfunction, unspecified erectile dysfunction type  Stable with medications. Refills sent to pharmacy.     - sildenafil (REVATIO) 20 MG tablet; Take 1 tablet (20 mg) by mouth daily as needed (ED)    Generalized anxiety disorder  Anxiety and panic attacks started 2 years ago. Stable on effexor. No refills needed at this time. He is considering weaning, but states summer is usually when he needs the medication, so he may consider it once fall comes.     Screening for HIV (human immunodeficiency virus)  Screening.     - HIV Antigen Antibody Combo    Need for hepatitis C screening test  Screening.     - Hepatitis C Screen Reflex to HCV RNA Quant and Genotype    Ordering of each unique test  Prescription drug management  30+ minutes spent by me on the date of the encounter doing chart review, history and exam, documentation and further activities per the note       Patient Instructions   EKG normal today which is reassuring.     Labs today to rule out other concerns.     If symptoms return, please follow-up and we can do a ziopatch (monitor for a week or two).     Stay hydrated.       Amaris Mcadams DNP  [facility] Sullivan is a 42 year old, presenting for the following health issues:  Palpitations (Felt an irregular heart rate the past few weeks  ")         No data to display              Palpitations  Pertinent negatives include no chest pain.   History of Present Illness       Reason for visit:  Heart spontaneously racing or beating hard  Symptom onset:  1-2 weeks ago    He eats 2-3 servings of fruits and vegetables daily.He consumes 1 sweetened beverage(s) daily.He exercises with enough effort to increase his heart rate 10 to 19 minutes per day.  He exercises with enough effort to increase his heart rate 3 or less days per week. He is missing 1 dose(s) of medications per week.    Today's PHQ-9         PHQ-9 Total Score: 0    PHQ-9 Q9 Thoughts of better off dead/self-harm past 2 weeks :   Not at all      Today's VA-7 Score: 2       Concern - irregular heart beat  Onset: 2 weeks or so  Description: will feel a \"pounding in his chest\" which will last a few seconds several times a day  Intensity: moderate  Progression of Symptoms:  Improving, hasn't had the past few days but was happening more often a few days ago  Accompanying Signs & Symptoms: more stress in life recently, unsure if related   Previous history of similar problem: none   Precipitating factors:        Worsened by: none   Alleviating factors:        Improved by: none  Therapies tried and outcome: None    Additional provider notes: Patient presents in clinic with concerns for irregular heart beat or pounding in his chest that comes on for a few seconds several times a day for the past 2 weeks or more. States he remembers it happening at work. States he hasn't noticed it over the past few days, but that it was worsening prior to that. He does have some stress, but nothing significant. Mom has cancer right now. Feels he is responding to his stressors fine, though. Doesn't feel he is significantly stressed when he notices the palpitations.    Tobacco use: very rarely while golfing or camping. Not regular. \"Everything in moderation\".     Exercise: walking daily. He used to lift weights, but hasn't " "lately. Golfing 2 times a month during good weather. Active lifestyle overall.      Has 1.4 yo at home so states he is always moving around chasing her.    Hx of panic attack: started on Effexor 2 years ago. Considering weaning off it, but states he has felt really good on it so unsure. States he has more anxiety in the summer, so maybe won't consider weaning off it until fall/winter.    Dad has hyperthyroidism.     ED: needing refill. Doesn't use frequently, no concerns.     Wanting to establish with this provider. Recently switched insurances and wanting to come here.     Allergies   Allergen Reactions     Seasonal Allergies        Current Outpatient Medications   Medication     IBUPROFEN PO     sildenafil (REVATIO) 20 MG tablet     venlafaxine (EFFEXOR XR) 150 MG 24 hr capsule     predniSONE (DELTASONE) 20 MG tablet     No current facility-administered medications for this visit.       Past Medical History:   Diagnosis Date     Right knee pain             Review of Systems   Constitutional: Negative.    Respiratory: Negative.    Cardiovascular: Positive for palpitations (improving). Negative for chest pain.   Neurological: Negative.    Psychiatric/Behavioral: The patient is not nervous/anxious (stable on meds).             Objective    /75 (BP Location: Right arm, Patient Position: Sitting, Cuff Size: Adult Regular)   Pulse 62   Temp 97  F (36.1  C) (Tympanic)   Resp 18   Ht 1.803 m (5' 11\")   Wt 81.2 kg (179 lb)   SpO2 96%   BMI 24.97 kg/m    Body mass index is 24.97 kg/m .  Physical Exam  Vitals reviewed.   Constitutional:       General: He is not in acute distress.     Appearance: Normal appearance. He is normal weight. He is not ill-appearing or toxic-appearing.   HENT:      Head: Normocephalic and atraumatic.      Right Ear: Tympanic membrane, ear canal and external ear normal. There is no impacted cerumen.      Left Ear: Tympanic membrane, ear canal and external ear normal. There is no impacted " cerumen.      Nose: Nose normal. No congestion.      Mouth/Throat:      Mouth: Mucous membranes are moist.      Pharynx: Oropharynx is clear. No posterior oropharyngeal erythema.   Cardiovascular:      Rate and Rhythm: Normal rate and regular rhythm.      Pulses: Normal pulses.      Heart sounds: Normal heart sounds.   Pulmonary:      Effort: Pulmonary effort is normal.      Breath sounds: Normal breath sounds.   Musculoskeletal:         General: Normal range of motion.      Cervical back: Normal range of motion and neck supple. No tenderness.   Lymphadenopathy:      Cervical: No cervical adenopathy.   Skin:     General: Skin is warm and dry.   Neurological:      Mental Status: He is alert and oriented to person, place, and time.   Psychiatric:         Behavior: Behavior normal.

## 2023-06-27 ENCOUNTER — TELEPHONE (OUTPATIENT)
Dept: FAMILY MEDICINE | Facility: CLINIC | Age: 43
End: 2023-06-27
Payer: COMMERCIAL

## 2023-06-27 NOTE — TELEPHONE ENCOUNTER
Called and spoke with patient- he does agree to plan and video visit is scheduled for Thursday 6/29/23 to go over lab results and possibly treatment      Yumiko Kelly MA

## 2023-06-27 NOTE — TELEPHONE ENCOUNTER
----- Message from Amaris Mcadams DNP sent at 6/26/2023  1:05 PM CDT -----  Please call patient to schedule a virtual appt with me tomorrow if he is available to go over a couple abnormal results and possible medication treatment options. If he can't do the 5:30pm virtual spot, you can use any of the same day spots for him to do virtual.     Thanks,  Amaris Mcadams DNP, NP-C

## 2023-06-28 ASSESSMENT — ENCOUNTER SYMPTOMS
NERVOUS/ANXIOUS: 0
CONSTITUTIONAL NEGATIVE: 1
RESPIRATORY NEGATIVE: 1
PALPITATIONS: 1
NEUROLOGICAL NEGATIVE: 1

## 2023-06-28 NOTE — ASSESSMENT & PLAN NOTE
The 10-year ASCVD risk score (Nelson VIERA, et al., 2019) is: 2%    Values used to calculate the score:      Age: 42 years      Sex: Male      Is Non- : No      Diabetic: No      Tobacco smoker: No      Systolic Blood Pressure: 110 mmHg      Is BP treated: No      HDL Cholesterol: 44 mg/dL      Total Cholesterol: 249 mg/dL

## 2023-06-29 ENCOUNTER — VIRTUAL VISIT (OUTPATIENT)
Dept: FAMILY MEDICINE | Facility: CLINIC | Age: 43
End: 2023-06-29
Payer: COMMERCIAL

## 2023-06-29 DIAGNOSIS — E03.8 SUBCLINICAL HYPOTHYROIDISM: Primary | ICD-10-CM

## 2023-06-29 DIAGNOSIS — R00.2 PALPITATIONS: ICD-10-CM

## 2023-06-29 DIAGNOSIS — E78.5 HYPERLIPIDEMIA, UNSPECIFIED HYPERLIPIDEMIA TYPE: ICD-10-CM

## 2023-06-29 PROCEDURE — 99213 OFFICE O/P EST LOW 20 MIN: CPT | Mod: VID | Performed by: NURSE PRACTITIONER

## 2023-06-29 ASSESSMENT — ENCOUNTER SYMPTOMS: CARDIOVASCULAR NEGATIVE: 1

## 2023-06-29 NOTE — PROGRESS NOTES
"Nate is a 42 year old who is being evaluated via a billable video visit.      How would you like to obtain your AVS? MyChart  If the video visit is dropped, the invitation should be resent by: Text to cell phone: [phone]  Will anyone else be joining your video visit? No  {If patient encounters technical issues they should call [phone] :331450}        {PROVIDER CHARTING PREFERENCE:809834}    Subjective   Nate is a 42 year old, presenting for the following health issues:  Results        6/29/2023     7:54 AM   Additional Questions   Roomed by [email or web address]     [facility]     Concern - results and treatment options  Onset: Labs drawn 6/22/23; Abnormal TSH and Cholesterol      {additonal problems for provider to add (Optional):812236}      Review of Systems   {ROS COMP (Optional):108513}      Objective    Vitals - Patient Reported  Pain Score: No Pain (0)        Physical Exam   {video visit exam brief selected:629893::\"GENERAL: Healthy, alert and no distress\",\"EYES: Eyes grossly normal to inspection.  No discharge or erythema, or obvious scleral/conjunctival abnormalities.\",\"RESP: No audible wheeze, cough, or visible cyanosis.  No visible retractions or increased work of breathing.  \",\"SKIN: Visible skin clear. No significant rash, abnormal pigmentation or lesions.\",\"NEURO: Cranial nerves grossly intact.  Mentation and speech appropriate for age.\",\"PSYCH: Mentation appears normal, affect normal/bright, judgement and insight intact, normal speech and appearance well-groomed.\"}    {Diagnostic Test Results (Optional):307545}    {AMBULATORY ATTESTATION (Optional):955858}        Video-Visit Details    Type of service:  Video Visit     Originating Location (pt. Location): {video visit patient location:795097::\"Home\"}  {PROVIDER LOCATION On-site should be selected for visits conducted from your clinic location or adjoining [facility] hospital, academic office, or other nearby [facility] building. Off-site should be selected for all other " "provider locations, including home:777160}  Distant Location (provider location):  {virtual location provider:654447}  Platform used for Video Visit: {Virtual Visit Platforms:298963::\"[employer]\"}    "

## 2023-06-29 NOTE — PROGRESS NOTES
"Nate is a 42 year old who is being evaluated via a billable video visit.      How would you like to obtain your AVS? MyChart  If the video visit is dropped, the invitation should be resent by: Text to cell phone: [phone]  Will anyone else be joining your video visit? No  {If patient encounters technical issues they should call [phone] :260289}        {PROVIDER CHARTING PREFERENCE:196970}    Subjective   Nate is a 42 year old, presenting for the following health issues:  Results        6/29/2023     7:30 AM   Additional Questions   Roomed by Denise IBANEZ         Concern - results and treatment options  Onset: Labs drawn 6/22/23  Abnormal TSH and Cholesterol    {additonal problems for provider to add (Optional):406082}      Review of Systems   {ROS COMP (Optional):811033}      Objective           Vitals:  No vitals were obtained today due to virtual visit.    Physical Exam   {video visit exam brief selected:624367::\"GENERAL: Healthy, alert and no distress\",\"EYES: Eyes grossly normal to inspection.  No discharge or erythema, or obvious scleral/conjunctival abnormalities.\",\"RESP: No audible wheeze, cough, or visible cyanosis.  No visible retractions or increased work of breathing.  \",\"SKIN: Visible skin clear. No significant rash, abnormal pigmentation or lesions.\",\"NEURO: Cranial nerves grossly intact.  Mentation and speech appropriate for age.\",\"PSYCH: Mentation appears normal, affect normal/bright, judgement and insight intact, normal speech and appearance well-groomed.\"}    {Diagnostic Test Results (Optional):099480}    {AMBULATORY ATTESTATION (Optional):035335}        Video-Visit Details    Type of service:  Video Visit     Originating Location (pt. Location): {video visit patient location:252168::\"Home\"}  {PROVIDER LOCATION On-site should be selected for visits conducted from your clinic location or adjoining [facility] hospital, academic office, or other nearby [facility] building. Off-site should be selected " "for all other provider locations, including home:581597}  Distant Location (provider location):  {virtual location provider:253671}  Platform used for Video Visit: {Virtual Visit Platforms:495930::\"[employer]\"}    "

## 2023-06-29 NOTE — PROGRESS NOTES
Nate is a 42 year old who is being evaluated via a billable video visit.      How would you like to obtain your AVS? MyChart  If the video visit is dropped, the invitation should be resent by: Text to cell phone: [phone]  Will anyone else be joining your video visit? No        Assessment & Plan     Subclinical hypothyroidism  Only mildly elevated. Denies symptoms. He would like to monitor symptoms and labs yearly. He will follow-up sooner if he develops any symptoms. Discussed symptoms to monitor for.     Hyperlipidemia, unspecified hyperlipidemia type  ASCVD risk score is only 2%. Discussed diet, sleep and exercise recommendations. Will monitor yearly.    Palpitations  Resolved. No further symptoms.         I spent a total of 35 minutes on the day of the visit.   Time spent by me doing chart review, history and exam, documentation and further activities per the note       There are no Patient Instructions on file for this visit.    Amaris Mcadams [facility]   Nate is a 42 year old, presenting for the following health issues:  Results        6/29/2023     7:54 AM   Additional Questions   Roomed by [address]     Concern - results  Onset: drawn 6/22/23  Description: abnormal TSH and Cholesterol  Discuss treatment options       Additional provider notes: Patient presents virtually for video visit to discuss labs.     Palpitations: improved.     Subclinical hypothyroidism: he would like to monitor levels and not take medications since he is feeling better and not symptomatic any more.    Elevated glucose: he was not fasting, so this is normal.     HLD: does white rice, otherwise eats pretty healthy otherwise. Does eat out 3-4 days a week, but not fast food. Goes on walks with family, but doesn't do his biking that he used to do. He also used to run, but has had several injuries and hasn't been able to do that for a while.     The 10-year ASCVD risk score  (Nelson VIERA, et al., 2019) is: 2%    Values used to calculate the score:      Age: 42 years      Sex: Male      Is Non- : No      Diabetic: No      Tobacco smoker: No      Systolic Blood Pressure: 110 mmHg      Is BP treated: No      HDL Cholesterol: 44 mg/dL      Total Cholesterol: 249 mg/dL      Allergies   Allergen Reactions     Seasonal Allergies        Current Outpatient Medications   Medication     IBUPROFEN PO     sildenafil (REVATIO) 20 MG tablet     venlafaxine (EFFEXOR XR) 150 MG 24 hr capsule     No current facility-administered medications for this visit.       Past Medical History:   Diagnosis Date     Right knee pain             Review of Systems   Cardiovascular: Negative.  Negative for peripheral edema.   All other systems reviewed and are negative.           Objective    Vitals - Patient Reported  Pain Score: No Pain (0)        Physical Exam   GENERAL: Healthy, alert and no distress  EYES: Eyes grossly normal to inspection.  No discharge or erythema, or obvious scleral/conjunctival abnormalities.  RESP: No audible wheeze, cough, or visible cyanosis.  No visible retractions or increased work of breathing.    SKIN: Visible skin clear. No significant rash, abnormal pigmentation or lesions.  NEURO: Cranial nerves grossly intact.  Mentation and speech appropriate for age.  PSYCH: Mentation appears normal, affect normal/bright, judgement and insight intact, normal speech and appearance well-groomed.    Office Visit on 06/22/2023   Component Date Value Ref Range Status     HIV Antigen Antibody Combo 06/22/2023 Nonreactive  Nonreactive Final    HIV-1 p24 Ag & HIV-1/HIV-2 Ab Not Detected     Hepatitis C Antibody 06/22/2023 Nonreactive  Nonreactive Final     Cholesterol 06/22/2023 249 (H)  <200 mg/dL Final     Triglycerides 06/22/2023 279 (H)  <150 mg/dL Final     Direct Measure HDL 06/22/2023 44  >=40 mg/dL Final     LDL Cholesterol Calculated 06/22/2023 149 (H)  <=100 mg/dL Final      Non HDL Cholesterol 06/22/2023 205 (H)  <130 mg/dL Final     Sodium 06/22/2023 140  136 - 145 mmol/L Final     Potassium 06/22/2023 4.4  3.4 - 5.3 mmol/L Final     Chloride 06/22/2023 104  98 - 107 mmol/L Final     Carbon Dioxide (CO2) 06/22/2023 26  22 - 29 mmol/L Final     Anion Gap 06/22/2023 10  7 - 15 mmol/L Final     Urea Nitrogen 06/22/2023 15.2  6.0 - 20.0 mg/dL Final     Creatinine 06/22/2023 0.87  0.67 - 1.17 mg/dL Final     Calcium 06/22/2023 9.8  8.6 - 10.0 mg/dL Final     Glucose 06/22/2023 102 (H)  70 - 99 mg/dL Final     Alkaline Phosphatase 06/22/2023 59  40 - 129 U/L Final     AST 06/22/2023 29  0 - 45 U/L Final    Reference intervals for this test were updated on 6/12/2023 to more accurately reflect our healthy population. There may be differences in the flagging of prior results with similar values performed with this method. Interpretation of those prior results can be made in the context of the updated reference intervals.     ALT 06/22/2023 29  0 - 70 U/L Final    Reference intervals for this test were updated on 6/12/2023 to more accurately reflect our healthy population. There may be differences in the flagging of prior results with similar values performed with this method. Interpretation of those prior results can be made in the context of the updated reference intervals.       Protein Total 06/22/2023 7.0  6.4 - 8.3 g/dL Final     Albumin 06/22/2023 4.6  3.5 - 5.2 g/dL Final     Bilirubin Total 06/22/2023 0.5  <=1.2 mg/dL Final     GFR Estimate 06/22/2023 >90  >60 mL/min/1.73m2 Final     WBC Count 06/22/2023 4.6  4.0 - 11.0 10e3/uL Final     RBC Count 06/22/2023 4.58  4.40 - 5.90 10e6/uL Final     Hemoglobin 06/22/2023 15.2  13.3 - 17.7 g/dL Final     Hematocrit 06/22/2023 43.2  40.0 - 53.0 % Final     MCV 06/22/2023 94  78 - 100 fL Final     MCH 06/22/2023 33.2 (H)  26.5 - 33.0 pg Final     MCHC 06/22/2023 35.2  31.5 - 36.5 g/dL Final     RDW 06/22/2023 11.4  10.0 - 15.0 % Final      Platelet Count 06/22/2023 218  150 - 450 10e3/uL Final     TSH 06/22/2023 5.02 (H)  0.30 - 4.20 uIU/mL Final     Free T4 06/22/2023 1.01  0.90 - 1.70 ng/dL Final               Video-Visit Details    Type of service:  Video Visit     Originating Location (pt. Location): Home  Distant Location (provider location):  On-site  Platform used for Video Visit: Bright

## 2023-09-09 DIAGNOSIS — F43.22 ADJUSTMENT DISORDER WITH ANXIETY: ICD-10-CM

## 2023-09-12 RX ORDER — VENLAFAXINE HYDROCHLORIDE 150 MG/1
CAPSULE, EXTENDED RELEASE ORAL
Qty: 90 CAPSULE | Refills: 3 | Status: SHIPPED | OUTPATIENT
Start: 2023-09-12 | End: 2024-09-06

## 2024-01-18 ENCOUNTER — OFFICE VISIT (OUTPATIENT)
Dept: FAMILY MEDICINE | Facility: CLINIC | Age: 44
End: 2024-01-18
Payer: COMMERCIAL

## 2024-01-18 VITALS
DIASTOLIC BLOOD PRESSURE: 87 MMHG | BODY MASS INDEX: 25.56 KG/M2 | SYSTOLIC BLOOD PRESSURE: 135 MMHG | WEIGHT: 182.6 LBS | HEIGHT: 71 IN | HEART RATE: 57 BPM | RESPIRATION RATE: 22 BRPM | TEMPERATURE: 97.9 F | OXYGEN SATURATION: 100 %

## 2024-01-18 DIAGNOSIS — Z00.00 ROUTINE GENERAL MEDICAL EXAMINATION AT A HEALTH CARE FACILITY: Primary | ICD-10-CM

## 2024-01-18 PROCEDURE — 90471 IMMUNIZATION ADMIN: CPT | Performed by: NURSE PRACTITIONER

## 2024-01-18 PROCEDURE — 91320 SARSCV2 VAC 30MCG TRS-SUC IM: CPT | Performed by: NURSE PRACTITIONER

## 2024-01-18 PROCEDURE — 99396 PREV VISIT EST AGE 40-64: CPT | Mod: 25 | Performed by: NURSE PRACTITIONER

## 2024-01-18 PROCEDURE — 90480 ADMN SARSCOV2 VAC 1/ONLY CMP: CPT | Performed by: NURSE PRACTITIONER

## 2024-01-18 PROCEDURE — 90686 IIV4 VACC NO PRSV 0.5 ML IM: CPT | Performed by: NURSE PRACTITIONER

## 2024-01-18 ASSESSMENT — PATIENT HEALTH QUESTIONNAIRE - PHQ9
SUM OF ALL RESPONSES TO PHQ QUESTIONS 1-9: 1
SUM OF ALL RESPONSES TO PHQ QUESTIONS 1-9: 1
10. IF YOU CHECKED OFF ANY PROBLEMS, HOW DIFFICULT HAVE THESE PROBLEMS MADE IT FOR YOU TO DO YOUR WORK, TAKE CARE OF THINGS AT HOME, OR GET ALONG WITH OTHER PEOPLE: SOMEWHAT DIFFICULT

## 2024-01-18 ASSESSMENT — PAIN SCALES - GENERAL: PAINLEVEL: NO PAIN (0)

## 2024-01-18 ASSESSMENT — ENCOUNTER SYMPTOMS
HEMATOCHEZIA: 0
HEARTBURN: 0
WEAKNESS: 0
EYE PAIN: 0
MYALGIAS: 0
PALPITATIONS: 0
PARESTHESIAS: 0
SHORTNESS OF BREATH: 0
JOINT SWELLING: 0
ARTHRALGIAS: 0
FREQUENCY: 0
NAUSEA: 0
SORE THROAT: 0
COUGH: 0
CONSTIPATION: 0
DYSURIA: 0
FEVER: 0
DIZZINESS: 0
ABDOMINAL PAIN: 0
DIARRHEA: 0
HEADACHES: 0
CHILLS: 0
NERVOUS/ANXIOUS: 0
HEMATURIA: 0

## 2024-01-18 ASSESSMENT — ANXIETY QUESTIONNAIRES
GAD7 TOTAL SCORE: 2
8. IF YOU CHECKED OFF ANY PROBLEMS, HOW DIFFICULT HAVE THESE MADE IT FOR YOU TO DO YOUR WORK, TAKE CARE OF THINGS AT HOME, OR GET ALONG WITH OTHER PEOPLE?: SOMEWHAT DIFFICULT
6. BECOMING EASILY ANNOYED OR IRRITABLE: SEVERAL DAYS
2. NOT BEING ABLE TO STOP OR CONTROL WORRYING: NOT AT ALL
5. BEING SO RESTLESS THAT IT IS HARD TO SIT STILL: NOT AT ALL
4. TROUBLE RELAXING: NOT AT ALL
7. FEELING AFRAID AS IF SOMETHING AWFUL MIGHT HAPPEN: NOT AT ALL
GAD7 TOTAL SCORE: 2
IF YOU CHECKED OFF ANY PROBLEMS ON THIS QUESTIONNAIRE, HOW DIFFICULT HAVE THESE PROBLEMS MADE IT FOR YOU TO DO YOUR WORK, TAKE CARE OF THINGS AT HOME, OR GET ALONG WITH OTHER PEOPLE: SOMEWHAT DIFFICULT
1. FEELING NERVOUS, ANXIOUS, OR ON EDGE: SEVERAL DAYS
3. WORRYING TOO MUCH ABOUT DIFFERENT THINGS: NOT AT ALL
GAD7 TOTAL SCORE: 2
7. FEELING AFRAID AS IF SOMETHING AWFUL MIGHT HAPPEN: NOT AT ALL

## 2024-01-18 NOTE — PROGRESS NOTES
Preventive Care Visit  [facility]  Amaris Mcadams DNP, Family Medicine  Jan 18, 2024      SUBJECTIVE:   Nate is a 43 year old, presenting for the following:  Physical        1/18/2024     3:51 PM   Additional Questions   Roomed by Cristina   Accompanied by none         1/18/2024     3:51 PM   Patient Reported Additional Medications   Patient reports taking the following new medications none     Healthy Habits:     Getting at least 3 servings of Calcium per day:  Yes    Bi-annual eye exam:  Yes    Dental care twice a year:  Yes    Sleep apnea or symptoms of sleep apnea:  None    Diet:  Regular (no restrictions)    Frequency of exercise:  1 day/week    Duration of exercise:  15-30 minutes    Taking medications regularly:  Yes    Medication side effects:  None    Additional concerns today:  Yes    Today's PHQ-9 Score:       1/18/2024     3:51 PM   PHQ-9 SCORE   PHQ-9 Total Score MyChart 1 (Minimal depression)   PHQ-9 Total Score 1               Depression and Anxiety Follow-Up  How are you doing with your depression since your last visit? No change  How are you doing with your anxiety since your last visit?  Improved   Are you having other symptoms that might be associated with depression or anxiety? No  Have you had a significant life event? Grief or Loss Mother passed away last month    Do you have any concerns with your use of alcohol or other drugs? No      Additional provider notes: Patient presents in clinic for annual physical. No concerns.     The 10-year ASCVD risk score (Nelson VIERA, et al., 2019) is: 3.1%    Values used to calculate the score:      Age: 43 years      Sex: Male      Is Non- : No      Diabetic: No      Tobacco smoker: No      Systolic Blood Pressure: 135 mmHg      Is BP treated: No      HDL Cholesterol: 44 mg/dL      Total Cholesterol: 249 mg/dL      Allergies   Allergen Reactions    Seasonal Allergies        Current Outpatient Medications    Medication    IBUPROFEN PO    sildenafil (REVATIO) 20 MG tablet    venlafaxine (EFFEXOR XR) 150 MG 24 hr capsule     No current facility-administered medications for this visit.       Past Medical History:   Diagnosis Date    Right knee pain         Social History     Tobacco Use    Smoking status: Former     Types: Cigarettes, Vaping Device     Passive exposure: Never    Smokeless tobacco: Never    Tobacco comments:     A few times a month during the summer will use an e-cig off and on about 5 years, was cigarettes initially but very rarely    Vaping Use    Vaping Use: Former   Substance Use Topics    Alcohol use: Yes     Comment: occas., 6 per week    Drug use: No         12/20/2022     1:57 PM 6/22/2023     7:49 AM 1/18/2024     3:51 PM   PHQ   PHQ-9 Total Score 2 0 1   Q9: Thoughts of better off dead/self-harm past 2 weeks Not at all Not at all Not at all         12/20/2022     1:57 PM 6/22/2023     7:50 AM 1/18/2024     3:51 PM   VA-7 SCORE   Total Score  2 (minimal anxiety) 2 (minimal anxiety)   Total Score 1 2 2         1/18/2024     3:51 PM   Last PHQ-9   1.  Little interest or pleasure in doing things 0   2.  Feeling down, depressed, or hopeless 0   3.  Trouble falling or staying asleep, or sleeping too much 0   4.  Feeling tired or having little energy 1   5.  Poor appetite or overeating 0   6.  Feeling bad about yourself 0   7.  Trouble concentrating 0   8.  Moving slowly or restless 0   Q9: Thoughts of better off dead/self-harm past 2 weeks 0   PHQ-9 Total Score 1         1/18/2024     3:51 PM   VA-7    1. Feeling nervous, anxious, or on edge 1   2. Not being able to stop or control worrying 0   3. Worrying too much about different things 0   4. Trouble relaxing 0   5. Being so restless that it is hard to sit still 0   6. Becoming easily annoyed or irritable 1   7. Feeling afraid, as if something awful might happen 0   VA-7 Total Score 2   If you checked any problems, how difficult have they made it  "for you to do your work, take care of things at home, or get along with other people? Somewhat difficult       Suicide Assessment Five-step Evaluation and Treatment (SAFE-T)    Have you ever done Advance Care Planning? (For example, a Health Directive, POLST, or a discussion with a medical provider or your loved ones about your wishes):     Social History     Tobacco Use    Smoking status: Former     Types: Cigarettes, Vaping Device     Passive exposure: Never    Smokeless tobacco: Never    Tobacco comments:     A few times a month during the summer will use an e-cig off and on about 5 years, was cigarettes initially but very rarely    Substance Use Topics    Alcohol use: Yes     Comment: occas., 6 per week             1/18/2024     3:56 PM   Alcohol Use   Prescreen: >3 drinks/day or >7 drinks/week? No       Last PSA: No results found for: \"PSA\"    Reviewed orders with patient. Reviewed health maintenance and updated orders accordingly - Yes      Reviewed and updated as needed this visit by clinical staff   Tobacco  Allergies  Meds              Reviewed and updated as needed this visit by Provider                  Past Medical History:   Diagnosis Date    Right knee pain       Past Surgical History:   Procedure Laterality Date    ARTHROSCOPY KNEE  12/27/2013    Procedure: ARTHROSCOPY KNEE;  Right Knee Arthroscopy With Carticel Biopsy.;  Surgeon: Emmanuel Coley MD;  [facility]    ARTHROTOMY KNEE AUTOGENOUS CARTILAGE IMPLANT (GENZYME)  2/14/2014    Procedure: ARTHROTOMY KNEE AUTOGENOUS CARTILAGE IMPLANT (GENZYME);  Right Knee Medial PatellaFemoral Ligament Reconstruction With Allograft, Patellar Chondrocyte Implantation, ;  Surgeon: Emmanuel Coley MD;  [facility]    REALIGN PATELLA  2/14/2014    Procedure: REALIGN PATELLA;;  Surgeon: Emmanuel Coley MD;  [facility]: [facility]    wisdom teeth[  2009     Review of Systems   Constitutional:  Negative for chills and fever.   HENT:  Negative " "for congestion, ear pain, hearing loss and sore throat.    Eyes:  Negative for pain and visual disturbance.   Respiratory:  Negative for cough and shortness of breath.    Cardiovascular:  Negative for chest pain and palpitations.   Gastrointestinal:  Negative for abdominal pain, constipation, diarrhea and nausea.   Genitourinary:  Negative for dysuria, frequency, genital sores, hematuria, penile discharge and urgency.   Musculoskeletal:  Negative for arthralgias, joint swelling and myalgias.   Skin:  Negative for rash.   Neurological:  Negative for dizziness, weakness and headaches.   Psychiatric/Behavioral:  The patient is not nervous/anxious.          OBJECTIVE:   /87 (BP Location: Right arm, Patient Position: Sitting, Cuff Size: Adult Regular)   Pulse 57   Temp 97.9  F (36.6  C) (Oral)   Resp 22   Ht 1.803 m (5' 11\")   Wt 82.8 kg (182 lb 9.6 oz)   SpO2 100%   BMI 25.47 kg/m     Estimated body mass index is 25.47 kg/m  as calculated from the following:    Height as of this encounter: 1.803 m (5' 11\").    Weight as of this encounter: 82.8 kg (182 lb 9.6 oz).  Physical Exam  GENERAL: alert and no distress  EYES: Eyes grossly normal to inspection, PERRL and conjunctivae and sclerae normal  HENT: ear canals and TM's normal, nose and mouth without ulcers or lesions  NECK: no adenopathy, no asymmetry, masses, or scars  RESP: lungs clear to auscultation - no rales, rhonchi or wheezes  CV: regular rate and rhythm, normal S1 S2, no S3 or S4, no murmur, click or rub, no peripheral edema  ABDOMEN: soft, nontender, no hepatosplenomegaly, no masses and bowel sounds normal  MS: no gross musculoskeletal defects noted, no edema  SKIN: no suspicious lesions or rashes  NEURO: Normal strength and tone, mentation intact and speech normal  PSYCH: mentation appears normal, affect normal/bright  LYMPH: no cervical, supraclavicular, axillary, or inguinal adenopathy    Diagnostic Test Results:  Labs reviewed in " "Epic    ASSESSMENT/PLAN:   Routine general medical examination at a health care facility  No concerns today. Flu and COVID vaccines given.     Patient has been advised of split billing requirements and indicates understanding: No      Counseling  Reviewed preventive health counseling, as reflected in patient instructions       Regular exercise       Healthy diet/nutrition       Vision screening       Immunizations  Vaccinated for: Covid-19 and Influenza        BMI  Estimated body mass index is 25.47 kg/m  as calculated from the following:    Height as of this encounter: 1.803 m (5' 11\").    Weight as of this encounter: 82.8 kg (182 lb 9.6 oz).   Weight management plan: Discussed healthy diet and exercise guidelines      He reports that he has quit smoking. His smoking use included cigarettes and vaping device. He has never been exposed to tobacco smoke. He has never used smokeless tobacco.          Signed Electronically by: Amaris Mcadams DNP  Answers submitted by the patient for this visit:  Preventative Adult Visit on 1/18/2024  4:00 PM with Amaris Mcadams  Patient Health Questionnaire (Submitted on 1/18/2024)  If you checked off any problems, how difficult have these problems made it for you to do your work, take care of things at home, or get along with other people?: Somewhat difficult  PHQ9 TOTAL SCORE: 1  VA-7 (Submitted on 1/18/2024)  VA 7 TOTAL SCORE: 2  Annual Preventive Visit (Submitted on 1/18/2024)  Chief Complaint: Annual Exam:  Frequency of exercise:: 1 day/week  Getting at least 3 servings of Calcium per day:: Yes  Diet:: Regular (no restrictions)  Taking medications regularly:: Yes  Medication side effects:: None  Bi-annual eye exam:: Yes  Dental care twice a year:: Yes  Sleep apnea or symptoms of sleep apnea:: None  abdominal pain: No  Blood in stool: No  Blood in urine: No  chest pain: No  chills: No  congestion: No  constipation: No  cough: No  diarrhea: No  dizziness: No  ear pain: No  eye " pain: No  nervous/anxious: No  fever: No  frequency: No  genital sores: No  headaches: No  hearing loss: No  heartburn: No  arthralgias: No  joint swelling: No  peripheral edema: No  mood changes: No  myalgias: No  nausea: No  dysuria: No  palpitations: No  Skin sensation changes: No  sore throat: No  urgency: No  rash: No  shortness of breath: No  visual disturbance: No  weakness: No  impotence: No  penile discharge: No  Additional concerns today:: Yes

## 2024-01-18 NOTE — NURSING NOTE
Prior to immunization administration, verified patients identity using patient s name and date of birth. Please see Immunization Activity for additional information.     Screening Questionnaire for Adult Immunization    Are you sick today?   No   Do you have allergies to medications, food, a vaccine component or latex?   No   Have you ever had a serious reaction after receiving a vaccination?   No   Do you have a long-term health problem with heart, lung, kidney, or metabolic disease (e.g., diabetes), asthma, a blood disorder, no spleen, complement component deficiency, a cochlear implant, or a spinal fluid leak?  Are you on long-term aspirin therapy?   No   Do you have cancer, leukemia, HIV/AIDS, or any other immune system problem?   No   Do you have a parent, brother, or sister with an immune system problem?   No   In the past 3 months, have you taken medications that affect  your immune system, such as prednisone, other steroids, or anticancer drugs; drugs for the treatment of rheumatoid arthritis, Crohn s disease, or psoriasis; or have you had radiation treatments?   No   Have you had a seizure, or a brain or other nervous system problem?   No   During the past year, have you received a transfusion of blood or blood    products, or been given immune (gamma) globulin or antiviral drug?   No   For women: Are you pregnant or is there a chance you could become       pregnant during the next month?   No   Have you received any vaccinations in the past 4 weeks?   No     Immunization questionnaire answers were all negative.      Patient instructed to remain in clinic for 15 minutes afterwards, and to report any adverse reactions.     Screening performed by Cristina Nugent MA on 1/18/2024 at 4:01 PM.

## 2024-01-18 NOTE — PATIENT INSTRUCTIONS
"Ways to improve your cholesterol...     1- Eats less saturated fats (including avoiding \"trans\" fats).     2 - Eat more unsaturated fats  - found in vegetables, grains, and tree nuts.  Also by replacing butter with canola oil or olive oil.     3 - Eat more nuts.  1-2 ounces (a small handful) of almonds, walnuts, hazelnuts or pecans once a day in place of other less healthy snacks.     4 - Eat more high fiber foods - vegetables and whole grains including oat bran, oats, beans, peas, and flax seed.     5 - Eat more fish - such as salmon, tuna, mackerel, and sardines.  1 or 2 six ounce servings per week is a healthy replacement for other proteins.     6 - Exercise for at least 120 minutes per week - which is equal to 30 minutes 4 days per week.      Preventive Health Recommendations  Male Ages 40 to 49    Yearly exam:             See your health care provider every year in order to  o   Review health changes.   o   Discuss preventive care.    o   Review your medicines if your doctor has prescribed any.  You should be tested each year for STDs (sexually transmitted diseases) if you re at risk.   Have a cholesterol test every 5 years.   Have a colonoscopy (test for colon cancer) beginning at age 45.  Ask your provider about other options like a yearly FIT test or Cologuard test every 3 years (stool tests)   After age 45, have a diabetes test (fasting glucose). If you are at risk for diabetes, you should have this test every 3 years.    Talk with your health care provider about whether or not a prostate cancer screening test (PSA) is right for you.    Shots: Get a flu shot each year. Get a tetanus shot every 10 years.     Nutrition:  Eat at least 5 servings of fruits and vegetables daily.   Eat whole-grain bread, whole-wheat pasta and brown rice instead of white grains and rice.   Get adequate Calcium and Vitamin D.     Lifestyle  Exercise for at least 150 minutes a week (30 minutes a day, 5 days a week). This will help " you control your weight and prevent disease.   Limit alcohol to one drink per day.   No smoking.   Wear sunscreen to prevent skin cancer.   See your dentist every six months for an exam and cleaning.

## 2024-08-17 DIAGNOSIS — N52.9 ERECTILE DYSFUNCTION, UNSPECIFIED ERECTILE DYSFUNCTION TYPE: ICD-10-CM

## 2024-08-19 RX ORDER — SILDENAFIL CITRATE 20 MG/1
20 TABLET ORAL DAILY PRN
Qty: 15 TABLET | Refills: 7 | OUTPATIENT
Start: 2024-08-19

## 2024-08-22 ENCOUNTER — MYC REFILL (OUTPATIENT)
Dept: FAMILY MEDICINE | Facility: CLINIC | Age: 44
End: 2024-08-22
Payer: COMMERCIAL

## 2024-08-22 DIAGNOSIS — N52.9 ERECTILE DYSFUNCTION, UNSPECIFIED ERECTILE DYSFUNCTION TYPE: ICD-10-CM

## 2024-08-22 RX ORDER — SILDENAFIL CITRATE 20 MG/1
20 TABLET ORAL DAILY PRN
Qty: 30 TABLET | Refills: 3 | Status: SHIPPED | OUTPATIENT
Start: 2024-08-22

## 2024-09-06 DIAGNOSIS — F43.22 ADJUSTMENT DISORDER WITH ANXIETY: Primary | ICD-10-CM

## 2024-09-06 RX ORDER — VENLAFAXINE HYDROCHLORIDE 150 MG/1
CAPSULE, EXTENDED RELEASE ORAL
Qty: 90 CAPSULE | Refills: 3 | Status: SHIPPED | OUTPATIENT
Start: 2024-09-06

## 2025-03-09 ENCOUNTER — HEALTH MAINTENANCE LETTER (OUTPATIENT)
Age: 45
End: 2025-03-09

## 2025-03-30 SDOH — HEALTH STABILITY: PHYSICAL HEALTH: ON AVERAGE, HOW MANY DAYS PER WEEK DO YOU ENGAGE IN MODERATE TO STRENUOUS EXERCISE (LIKE A BRISK WALK)?: 1 DAY

## 2025-03-30 SDOH — HEALTH STABILITY: PHYSICAL HEALTH: ON AVERAGE, HOW MANY MINUTES DO YOU ENGAGE IN EXERCISE AT THIS LEVEL?: 30 MIN

## 2025-03-30 ASSESSMENT — ANXIETY QUESTIONNAIRES
3. WORRYING TOO MUCH ABOUT DIFFERENT THINGS: SEVERAL DAYS
7. FEELING AFRAID AS IF SOMETHING AWFUL MIGHT HAPPEN: SEVERAL DAYS
GAD7 TOTAL SCORE: 5
GAD7 TOTAL SCORE: 5
1. FEELING NERVOUS, ANXIOUS, OR ON EDGE: SEVERAL DAYS
4. TROUBLE RELAXING: NOT AT ALL
GAD7 TOTAL SCORE: 5
2. NOT BEING ABLE TO STOP OR CONTROL WORRYING: SEVERAL DAYS
8. IF YOU CHECKED OFF ANY PROBLEMS, HOW DIFFICULT HAVE THESE MADE IT FOR YOU TO DO YOUR WORK, TAKE CARE OF THINGS AT HOME, OR GET ALONG WITH OTHER PEOPLE?: SOMEWHAT DIFFICULT
IF YOU CHECKED OFF ANY PROBLEMS ON THIS QUESTIONNAIRE, HOW DIFFICULT HAVE THESE PROBLEMS MADE IT FOR YOU TO DO YOUR WORK, TAKE CARE OF THINGS AT HOME, OR GET ALONG WITH OTHER PEOPLE: SOMEWHAT DIFFICULT
5. BEING SO RESTLESS THAT IT IS HARD TO SIT STILL: NOT AT ALL
7. FEELING AFRAID AS IF SOMETHING AWFUL MIGHT HAPPEN: SEVERAL DAYS
6. BECOMING EASILY ANNOYED OR IRRITABLE: SEVERAL DAYS

## 2025-03-30 ASSESSMENT — PATIENT HEALTH QUESTIONNAIRE - PHQ9
10. IF YOU CHECKED OFF ANY PROBLEMS, HOW DIFFICULT HAVE THESE PROBLEMS MADE IT FOR YOU TO DO YOUR WORK, TAKE CARE OF THINGS AT HOME, OR GET ALONG WITH OTHER PEOPLE: SOMEWHAT DIFFICULT
SUM OF ALL RESPONSES TO PHQ QUESTIONS 1-9: 1
SUM OF ALL RESPONSES TO PHQ QUESTIONS 1-9: 1

## 2025-03-30 ASSESSMENT — SOCIAL DETERMINANTS OF HEALTH (SDOH): HOW OFTEN DO YOU GET TOGETHER WITH FRIENDS OR RELATIVES?: ONCE A WEEK

## 2025-03-31 ENCOUNTER — OFFICE VISIT (OUTPATIENT)
Dept: FAMILY MEDICINE | Facility: CLINIC | Age: 45
End: 2025-03-31
Attending: NURSE PRACTITIONER
Payer: COMMERCIAL

## 2025-03-31 VITALS
RESPIRATION RATE: 14 BRPM | OXYGEN SATURATION: 99 % | WEIGHT: 179 LBS | SYSTOLIC BLOOD PRESSURE: 123 MMHG | HEIGHT: 71 IN | TEMPERATURE: 98.1 F | BODY MASS INDEX: 25.06 KG/M2 | HEART RATE: 59 BPM | DIASTOLIC BLOOD PRESSURE: 87 MMHG

## 2025-03-31 DIAGNOSIS — Z00.00 ROUTINE GENERAL MEDICAL EXAMINATION AT A HEALTH CARE FACILITY: Primary | ICD-10-CM

## 2025-03-31 DIAGNOSIS — R79.89 ELEVATED TSH: ICD-10-CM

## 2025-03-31 DIAGNOSIS — E78.5 HYPERLIPIDEMIA LDL GOAL <100: ICD-10-CM

## 2025-03-31 DIAGNOSIS — Z30.09 UNWANTED FERTILITY: ICD-10-CM

## 2025-03-31 DIAGNOSIS — F43.22 ADJUSTMENT DISORDER WITH ANXIETY: ICD-10-CM

## 2025-03-31 DIAGNOSIS — N52.9 ERECTILE DYSFUNCTION, UNSPECIFIED ERECTILE DYSFUNCTION TYPE: ICD-10-CM

## 2025-03-31 PROCEDURE — 99214 OFFICE O/P EST MOD 30 MIN: CPT | Mod: 25 | Performed by: NURSE PRACTITIONER

## 2025-03-31 PROCEDURE — 96127 BRIEF EMOTIONAL/BEHAV ASSMT: CPT | Performed by: NURSE PRACTITIONER

## 2025-03-31 PROCEDURE — 3079F DIAST BP 80-89 MM HG: CPT | Performed by: NURSE PRACTITIONER

## 2025-03-31 PROCEDURE — 3074F SYST BP LT 130 MM HG: CPT | Performed by: NURSE PRACTITIONER

## 2025-03-31 PROCEDURE — 99396 PREV VISIT EST AGE 40-64: CPT | Performed by: NURSE PRACTITIONER

## 2025-03-31 PROCEDURE — 1125F AMNT PAIN NOTED PAIN PRSNT: CPT | Performed by: NURSE PRACTITIONER

## 2025-03-31 RX ORDER — SILDENAFIL CITRATE 20 MG/1
20 TABLET ORAL DAILY PRN
Qty: 30 TABLET | Refills: 3 | Status: SHIPPED | OUTPATIENT
Start: 2025-03-31

## 2025-03-31 RX ORDER — VENLAFAXINE HYDROCHLORIDE 75 MG/1
75 CAPSULE, EXTENDED RELEASE ORAL DAILY
Qty: 90 CAPSULE | Refills: 3 | Status: SHIPPED | OUTPATIENT
Start: 2025-03-31

## 2025-03-31 RX ORDER — VENLAFAXINE HYDROCHLORIDE 150 MG/1
150 CAPSULE, EXTENDED RELEASE ORAL DAILY
Qty: 90 CAPSULE | Refills: 3 | Status: CANCELLED | OUTPATIENT
Start: 2025-03-31

## 2025-03-31 ASSESSMENT — PAIN SCALES - GENERAL: PAINLEVEL_OUTOF10: MODERATE PAIN (5)

## 2025-03-31 NOTE — PROGRESS NOTES
Preventive Care Visit  [facility]  Amaris Mcadams DNP, Family Medicine  Mar 31, 2025      Assessment & Plan     Routine general medical examination at a health care facility  Routine prevent.     Adjustment disorder with anxiety  Doing well on medication. He is wanting to trial reducing dose from 150mg to 75mg. Will see how he does on this. He will mychart message me if he wants the dose increased back to 150mg. No extra visit required.     - Basic metabolic panel  (Ca, Cl, CO2, Creat, Gluc, K, Na, BUN); Future  - venlafaxine (EFFEXOR XR) 75 MG 24 hr capsule; Take 1 capsule (75 mg) by mouth daily.    Hyperlipidemia LDL goal <100  Not fasting today. He will return another day for fasting labs.     - Lipid panel reflex to direct LDL Non-fasting; Future    Erectile dysfunction, unspecified erectile dysfunction type  Stable with PRN use of Sildenafil.     - sildenafil (REVATIO) 20 MG tablet; Take 1 tablet (20 mg) by mouth daily as needed (ED).  - Basic metabolic panel  (Ca, Cl, CO2, Creat, Gluc, K, Na, BUN); Future    Elevated TSH  Elevated in the past. No symptoms. Continue to monitor.     - TSH with free T4 reflex; Future    Unwanted fertility  Considering vasectomy. Referral placed.     - Adult Urology  Referral; Future    Patient has been advised of split billing requirements and indicates understanding: Yes, reviewed by MA        Counseling  Appropriate preventive services were addressed with this patient via screening, questionnaire, or discussion as appropriate for fall prevention, nutrition, physical activity, Tobacco-use cessation, social engagement, weight loss and cognition.  Checklist reviewing preventive services available has been given to the patient.  Reviewed patient's diet, addressing concerns and/or questions.   He is at risk for lack of exercise and has been provided with information to increase physical activity for the benefit of his well-being.   The patient  was instructed to see the dentist every 6 months.   The patient reports drinking more than 3 alcoholic drinks per day and/or more than 7 drhnks per week. The patient was counseled and given information about possible harmful effects of excessive alcohol intake.    See Patient Instructions    Alessio Sullivan is a 44 year old, presenting for the following:  Physical, Recheck Medication, and Blood Draw (Discuss labs and lab testing )        3/31/2025    10:23 AM   Additional Questions   Roomed by Cristina   Accompanied by none         3/31/2025    10:23 AM   Patient Reported Additional Medications   Patient reports taking the following new medications Claritin          HPI       Anxiety   How are you doing with your anxiety since your last visit? No change  Are you having other symptoms that might be associated with anxiety? No  Have you had a significant life event? OTHER: new baby, mother passed away about a year ago    Are you feeling depressed? No  Do you have any concerns with your use of alcohol or other drugs? No    Social History     Tobacco Use    Smoking status: Former     Types: Cigarettes, Vaping Device     Passive exposure: Never    Smokeless tobacco: Never    Tobacco comments:     A few times a month during the summer will use an e-cig off and on about 5 years, was cigarettes initially but very rarely    Vaping Use    Vaping status: Former   Substance Use Topics    Alcohol use: Yes     Comment: occas., 6 per week    Drug use: No         6/22/2023     7:50 AM 1/18/2024     3:51 PM 3/30/2025    11:41 AM   VA-7 SCORE   Total Score 2 (minimal anxiety) 2 (minimal anxiety) 5 (mild anxiety)   Total Score 2 2 5        Patient-reported         6/22/2023     7:49 AM 1/18/2024     3:51 PM 3/30/2025    11:40 AM   PHQ   PHQ-9 Total Score 0 1 1    Q9: Thoughts of better off dead/self-harm past 2 weeks Not at all  Not at all Not at all       Patient-reported    Proxy-reported         3/30/2025    11:40 AM   Last PHQ-9    1.  Little interest or pleasure in doing things 0   2.  Feeling down, depressed, or hopeless 0   3.  Trouble falling or staying asleep, or sleeping too much 0   4.  Feeling tired or having little energy 1   5.  Poor appetite or overeating 0   6.  Feeling bad about yourself 0   7.  Trouble concentrating 0   8.  Moving slowly or restless 0   Q9: Thoughts of better off dead/self-harm past 2 weeks 0   PHQ-9 Total Score 1        Patient-reported         3/30/2025    11:41 AM   VA-7    1. Feeling nervous, anxious, or on edge 1   2. Not being able to stop or control worrying 1   3. Worrying too much about different things 1   4. Trouble relaxing 0   5. Being so restless that it is hard to sit still 0   6. Becoming easily annoyed or irritable 1   7. Feeling afraid, as if something awful might happen 1   VA-7 Total Score 5    If you checked any problems, how difficult have they made it for you to do your work, take care of things at home, or get along with other people? Somewhat difficult       Patient-reported     Medication Followup of sildenafil   Taking Medication as prescribed: yes  Side Effects:  None  Medication Helping Symptoms:  yes    Discuss possible vasectomy in the future       Additional provider notes: Patient presents for the following:     Annual prevent.     Anxiety: takes effexor.    ED: takes sildenafil    HLD: not currently on medication.   The 10-year ASCVD risk score (Nelson VIERA, et al., 2019) is: 2.9%    Values used to calculate the score:      Age: 44 years      Sex: Male      Is Non- : No      Diabetic: No      Tobacco smoker: No      Systolic Blood Pressure: 123 mmHg      Is BP treated: No      HDL Cholesterol: 44 mg/dL      Total Cholesterol: 249 mg/dL     Allergies   Allergen Reactions    Seasonal Allergies        Current Outpatient Medications   Medication Sig Dispense Refill    IBUPROFEN PO Take by mouth as needed for moderate pain      sildenafil (REVATIO) 20 MG  tablet Take 1 tablet (20 mg) by mouth daily as needed (ED). 30 tablet 3    venlafaxine (EFFEXOR XR) 150 MG 24 hr capsule TAKE 1 CAPSULE BY MOUTH EVERY DAY 90 capsule 3     No current facility-administered medications for this visit.       Past Medical History:   Diagnosis Date    Right knee pain         Advance Care Planning  Patient does not have a Health Care Directive: Discussed advance care planning with patient; however, patient declined at this time.      3/30/2025   General Health   How would you rate your overall physical health? Good   Feel stress (tense, anxious, or unable to sleep) Not at all         3/30/2025   Nutrition   Three or more servings of calcium each day? Yes   Diet: Regular (no restrictions)   How many servings of fruit and vegetables per day? (!) 2-3   How many sweetened beverages each day? 0-1         3/30/2025   Exercise   Days per week of moderate/strenous exercise 1 day   Average minutes spent exercising at this level 30 min   (!) EXERCISE CONCERN      3/30/2025   Social Factors   Frequency of gathering with friends or relatives Once a week   Worry food won't last until get money to buy more No   Food not last or not have enough money for food? No   Do you have housing? (Housing is defined as stable permanent housing and does not include staying ouside in a car, in a tent, in an abandoned building, in an overnight shelter, or couch-surfing.) Yes   Are you worried about losing your housing? No   Lack of transportation? No   Unable to get utilities (heat,electricity)? No         3/30/2025   Dental   Dentist two times every year? (!) NO         Today's PHQ-9 Score:       3/30/2025    11:40 AM   PHQ-9 SCORE   PHQ-9 Total Score MyChart 1 (Minimal depression)   PHQ-9 Total Score 1        Patient-reported         3/30/2025   Substance Use   Alcohol more than 3/day or more than 7/wk Yes   How often do you have a drink containing alcohol 2 to 3 times a week   How many alcohol drinks on typical  day 3 or 4   How often do you have 5+ drinks at one occasion Monthly   Audit 2/3 Score 3   How often not able to stop drinking once started Never   How often failed to do what normally expected Never   How often needed first drink in am after a heavy drinking session Never   How often feeling of guilt or remorse after drinking Less than monthly   How often unable to remember what happened the night before Never   Have you or someone else been injured because of your drinking No   Has anyone been concerned or suggested you cut down on drinking No   TOTAL SCORE - AUDIT 7   Do you use any other substances recreationally? No     Social History     Tobacco Use    Smoking status: Former     Types: Cigarettes, Vaping Device     Passive exposure: Never    Smokeless tobacco: Never    Tobacco comments:     A few times a month during the summer will use an e-cig off and on about 5 years, was cigarettes initially but very rarely    Vaping Use    Vaping status: Former   Substance Use Topics    Alcohol use: Yes     Comment: occas., 6 per week    Drug use: No           3/30/2025   STI Screening   New sexual partner(s) since last STI/HIV test? No   ASCVD Risk   The 10-year ASCVD risk score (Nelson VIERA, et al., 2019) is: 2.9%    Values used to calculate the score:      Age: 44 years      Sex: Male      Is Non- : No      Diabetic: No      Tobacco smoker: No      Systolic Blood Pressure: 123 mmHg      Is BP treated: No      HDL Cholesterol: 44 mg/dL      Total Cholesterol: 249 mg/dL        3/30/2025   Contraception/Family Planning   Questions about contraception or family planning (!) YES , considering vasectomy        Reviewed and updated as needed this visit by Provider                    Past Medical History:   Diagnosis Date    Right knee pain      Past Surgical History:   Procedure Laterality Date    ARTHROSCOPY KNEE  12/27/2013    Procedure: ARTHROSCOPY KNEE;  Right Knee Arthroscopy With Carticel  "Biopsy.;  Surgeon: Emmanuel Coley MD;  [facility]    ARTHROTOMY KNEE AUTOGENOUS CARTILAGE IMPLANT (GENZYME)  2/14/2014    Procedure: ARTHROTOMY KNEE AUTOGENOUS CARTILAGE IMPLANT (GENZYME);  Right Knee Medial PatellaFemoral Ligament Reconstruction With Allograft, Patellar Chondrocyte Implantation, ;  Surgeon: Emmanuel Coley MD;  [facility]    REALIGN PATELLA  2/14/2014    Procedure: REALIGN PATELLA;;  Surgeon: Emmanuel Coley MD;  [facility]    wisdom teeth[  2009         Review of Systems  Constitutional, HEENT, cardiovascular, pulmonary, gi and gu systems are negative, except as otherwise noted.     Objective    Exam  /87 (BP Location: Right arm, Patient Position: Sitting, Cuff Size: Adult Regular)   Pulse 59   Temp 98.1  F (36.7  C) (Oral)   Resp 14   Ht 1.81 m (5' 11.25\")   Wt 81.2 kg (179 lb)   SpO2 99%   BMI 24.79 kg/m     Estimated body mass index is 24.79 kg/m  as calculated from the following:    Height as of this encounter: 1.81 m (5' 11.25\").    Weight as of this encounter: 81.2 kg (179 lb).    Physical Exam  GENERAL: alert and no distress  EYES: Eyes grossly normal to inspection, PERRL and conjunctivae and sclerae normal  HENT: ear canals and TM's normal, nose and mouth without ulcers or lesions  NECK: no adenopathy, no asymmetry, masses, or scars  RESP: lungs clear to auscultation - no rales, rhonchi or wheezes  CV: regular rate and rhythm, normal S1 S2, no S3 or S4, no murmur, click or rub, no peripheral edema  ABDOMEN: soft, nontender, no hepatosplenomegaly, no masses and bowel sounds normal  MS: no gross musculoskeletal defects noted, no edema  SKIN: no suspicious lesions or rashes  NEURO: Normal strength and tone, mentation intact and speech normal  PSYCH: mentation appears normal, affect normal/bright        Signed Electronically by: Amaris Mcadams DNP    "

## 2025-03-31 NOTE — PATIENT INSTRUCTIONS
Patient Education   Preventive Care Advice   This is general advice given by our system to help you stay healthy. However, your care team may have specific advice just for you. Please talk to your care team about your preventive care needs.  Nutrition  Eat 5 or more servings of fruits and vegetables each day.  Try wheat bread, brown rice and whole grain pasta (instead of white bread, rice, and pasta).  Get enough calcium and vitamin D. Check the label on foods and aim for 100% of the RDA (recommended daily allowance).  Lifestyle  Exercise at least 150 minutes each week  (30 minutes a day, 5 days a week).  Do muscle strengthening activities 2 days a week. These help control your weight and prevent disease.  No smoking.  Wear sunscreen to prevent skin cancer.  Have a dental exam and cleaning every 6 months.  Yearly exams  See your health care team every year to talk about:  Any changes in your health.  Any medicines your care team has prescribed.  Preventive care, family planning, and ways to prevent chronic diseases.  Shots (vaccines)   HPV shots (up to age 26), if you've never had them before.  Hepatitis B shots (up to age 59), if you've never had them before.  COVID-19 shot: Get this shot when it's due.  Flu shot: Get a flu shot every year.  Tetanus shot: Get a tetanus shot every 10 years.  Pneumococcal, hepatitis A, and RSV shots: Ask your care team if you need these based on your risk.  Shingles shot (for age 50 and up)  General health tests  Diabetes screening:  Starting at age 35, Get screened for diabetes at least every 3 years.  If you are younger than age 35, ask your care team if you should be screened for diabetes.  Cholesterol test: At age 39, start having a cholesterol test every 5 years, or more often if advised.  Bone density scan (DEXA): At age 50, ask your care team if you should have this scan for osteoporosis (brittle bones).  Hepatitis C: Get tested at least once in your life.  STIs (sexually  transmitted infections)  Before age 24: Ask your care team if you should be screened for STIs.  After age 24: Get screened for STIs if you're at risk. You are at risk for STIs (including HIV) if:  You are sexually active with more than one person.  You don't use condoms every time.  You or a partner was diagnosed with a sexually transmitted infection.  If you are at risk for HIV, ask about PrEP medicine to prevent HIV.  Get tested for HIV at least once in your life, whether you are at risk for HIV or not.  Cancer screening tests  Cervical cancer screening: If you have a cervix, begin getting regular cervical cancer screening tests starting at age 21.  Breast cancer scan (mammogram): If you've ever had breasts, begin having regular mammograms starting at age 40. This is a scan to check for breast cancer.  Colon cancer screening: It is important to start screening for colon cancer at age 45.  Have a colonoscopy test every 10 years (or more often if you're at risk) Or, ask your provider about stool tests like a FIT test every year or Cologuard test every 3 years.  To learn more about your testing options, visit:   .  For help making a decision, visit:   https://bit.ly/ig77082.  Prostate cancer screening test: If you have a prostate, ask your care team if a prostate cancer screening test (PSA) at age 55 is right for you.  Lung cancer screening: If you are a current or former smoker ages 50 to 80, ask your care team if ongoing lung cancer screenings are right for you.  For informational purposes only. Not to replace the advice of your health care provider. Copyright   2023 [facility]. All rights reserved. Clinically reviewed by the [facility]. La[employer] 768863 - REV 01/24.  9 Ways to Cut Back on Drinking  Maybe you've found yourself drinking more alcohol than you'd prefer. If you want to cut back, here are some ideas to try.    Think before you drink.  Do you really want a drink,  "or is it just a habit? If you're used to having a drink at a certain time, try doing something else then.     Look for substitutes.  Find some no-alcohol drinks that you enjoy, like flavored seltzer water, tea with honey, or tonic with a slice of lime. Or try alcohol-free beer or \"virgin\" cocktails (without the alcohol).     Drink more water.  Use water to quench your thirst. Drink a glass of water before you have any alcohol. Have another glass along with every drink or between drinks.     Shrink your drink.  For example, have a bottle of beer instead of a pint. Use a smaller glass for wine. Choose drinks with lower alcohol content (ABV%). Or use less liquor and more mixer in cocktails.     Slow down.  It's easy to drink quickly and without thinking about it. Pay attention, and make each drink last longer.     Do the math.  Total up how much you spend on alcohol each month. How much is that a year? If you cut back, what could you do with the money you save?     Take a break.  Choose a day or two each week when you won't drink at all. Notice how you feel on those days, physically and emotionally. How did you sleep? Do you feel better? Over time, add more break days.     Count calories.  Would you like to lose some weight? For some people that's a good motivator for cutting back. Figure out how many calories are in each drink. How many does that add up to in a day? In a week? In a month?     Practice saying no.  Be ready when someone offers you a drink. Try: \"Thanks, I've had enough.\" Or \"Thanks, but I'm cutting back.\" Or \"No, thanks. I feel better when I drink less.\"   Current as of: August 20, 2024  Content Version: 14.4 2024-2025 [employer].   Care instructions adapted under license by your healthcare professional. If you have questions about a medical condition or this instruction, always ask your healthcare professional. [employer] disclaims any warranty or liability for your use of " this information.

## 2025-07-23 NOTE — TELEPHONE ENCOUNTER
MEDICAL RECORDS REQUEST   [facility]  Urology Clinic  [address] [place] [address]  PHONE: [phone]  Fax: [phone]        FUTURE VISIT INFORMATION                                                   Nate Rubio, : 1980 scheduled for future visit at [facility]    APPOINTMENT INFORMATION:  Date: 2025  Provider:  George Castelan MD  Reason for Visit/Diagnosis: VASECTOMY CONSULT      RECORDS REQUESTED FOR VISIT                                                     NOTES  STATUS/DETAILS   OFFICE NOTE from other specialist  yes, 2025 -- Amaris Mcadams, DNP @ [facility]   MEDICATION LIST  yes     PRE-VISIT CHECKLIST      Joint diagnostic appointment coordinated correctly          (ensure right order & amount of time) Yes   RECORD COLLECTION COMPLETE Yes

## 2025-07-24 ENCOUNTER — PRE VISIT (OUTPATIENT)
Dept: UROLOGY | Facility: CLINIC | Age: 45
End: 2025-07-24
Payer: COMMERCIAL

## 2025-07-24 NOTE — TELEPHONE ENCOUNTER
Reason for visit: vasectomy consult    Records/imaging/labs/orders: in epic    Insurance:  Does patient carry state insurance?  NO  Examples of state insurance:  Medicaid - this is straight Medical Assistance  [employer] - [place] product through [employer] [facility] - [place] product through [facility] PMAP  [employer] PMAP  [employer] [place]  [employer] PMAP  [facility] PMAP    If YES (Consent for Sterilization must be completed by patient and provider)    At Rooming: agustín Schulz  07/24/25  12:02 PM

## 2025-07-28 ENCOUNTER — MYC MEDICAL ADVICE (OUTPATIENT)
Dept: FAMILY MEDICINE | Facility: CLINIC | Age: 45
End: 2025-07-28
Payer: COMMERCIAL

## 2025-07-28 DIAGNOSIS — F43.22 ADJUSTMENT DISORDER WITH ANXIETY: ICD-10-CM

## 2025-07-29 ENCOUNTER — LAB (OUTPATIENT)
Dept: LAB | Facility: CLINIC | Age: 45
End: 2025-07-29
Payer: COMMERCIAL

## 2025-07-29 DIAGNOSIS — F43.22 ADJUSTMENT DISORDER WITH ANXIETY: ICD-10-CM

## 2025-07-29 DIAGNOSIS — N52.9 ERECTILE DYSFUNCTION, UNSPECIFIED ERECTILE DYSFUNCTION TYPE: ICD-10-CM

## 2025-07-29 DIAGNOSIS — R79.89 ELEVATED TSH: ICD-10-CM

## 2025-07-29 DIAGNOSIS — E78.5 HYPERLIPIDEMIA LDL GOAL <100: ICD-10-CM

## 2025-07-29 LAB
ANION GAP SERPL CALCULATED.3IONS-SCNC: 10 MMOL/L (ref 7–15)
BUN SERPL-MCNC: 15.8 MG/DL (ref 6–20)
CALCIUM SERPL-MCNC: 9.7 MG/DL (ref 8.8–10.4)
CHLORIDE SERPL-SCNC: 103 MMOL/L (ref 98–107)
CHOLEST SERPL-MCNC: 244 MG/DL
CREAT SERPL-MCNC: 0.97 MG/DL (ref 0.67–1.17)
EGFRCR SERPLBLD CKD-EPI 2021: >90 ML/MIN/1.73M2
FASTING STATUS PATIENT QL REPORTED: YES
FASTING STATUS PATIENT QL REPORTED: YES
GLUCOSE SERPL-MCNC: 102 MG/DL (ref 70–99)
HCO3 SERPL-SCNC: 26 MMOL/L (ref 22–29)
HDLC SERPL-MCNC: 47 MG/DL
LDLC SERPL CALC-MCNC: 172 MG/DL
NONHDLC SERPL-MCNC: 197 MG/DL
POTASSIUM SERPL-SCNC: 4.4 MMOL/L (ref 3.4–5.3)
SODIUM SERPL-SCNC: 139 MMOL/L (ref 135–145)
T4 FREE SERPL-MCNC: 0.87 NG/DL (ref 0.9–1.7)
TRIGL SERPL-MCNC: 126 MG/DL
TSH SERPL DL<=0.005 MIU/L-ACNC: 4.53 UIU/ML (ref 0.3–4.2)

## 2025-07-29 PROCEDURE — 84439 ASSAY OF FREE THYROXINE: CPT

## 2025-07-29 PROCEDURE — 36415 COLL VENOUS BLD VENIPUNCTURE: CPT

## 2025-07-29 PROCEDURE — 80048 BASIC METABOLIC PNL TOTAL CA: CPT

## 2025-07-29 PROCEDURE — 80061 LIPID PANEL: CPT

## 2025-07-29 PROCEDURE — 84443 ASSAY THYROID STIM HORMONE: CPT

## 2025-07-29 RX ORDER — VENLAFAXINE HYDROCHLORIDE 150 MG/1
150 CAPSULE, EXTENDED RELEASE ORAL DAILY
Qty: 90 CAPSULE | Refills: 2 | Status: SHIPPED | OUTPATIENT
Start: 2025-07-29

## 2025-08-14 ENCOUNTER — OFFICE VISIT (OUTPATIENT)
Dept: UROLOGY | Facility: CLINIC | Age: 45
End: 2025-08-14
Attending: NURSE PRACTITIONER
Payer: COMMERCIAL

## 2025-08-14 ENCOUNTER — PRE VISIT (OUTPATIENT)
Dept: UROLOGY | Facility: CLINIC | Age: 45
End: 2025-08-14

## 2025-08-14 VITALS
SYSTOLIC BLOOD PRESSURE: 143 MMHG | DIASTOLIC BLOOD PRESSURE: 79 MMHG | BODY MASS INDEX: 25.06 KG/M2 | HEART RATE: 74 BPM | OXYGEN SATURATION: 98 % | WEIGHT: 179 LBS | HEIGHT: 71 IN

## 2025-08-14 DIAGNOSIS — Z30.09 UNWANTED FERTILITY: ICD-10-CM
